# Patient Record
Sex: FEMALE | Race: WHITE | NOT HISPANIC OR LATINO | Employment: UNEMPLOYED | URBAN - METROPOLITAN AREA
[De-identification: names, ages, dates, MRNs, and addresses within clinical notes are randomized per-mention and may not be internally consistent; named-entity substitution may affect disease eponyms.]

---

## 2023-03-30 ENCOUNTER — HOSPITAL ENCOUNTER (OUTPATIENT)
Dept: DATA CONVERSION | Facility: HOSPITAL | Age: 78
End: 2023-03-30
Attending: RADIOLOGY | Admitting: RADIOLOGY
Payer: MEDICARE

## 2023-03-30 DIAGNOSIS — Z51.11 ENCOUNTER FOR ANTINEOPLASTIC CHEMOTHERAPY: ICD-10-CM

## 2023-03-30 DIAGNOSIS — C50.919 MALIGNANT NEOPLASM OF UNSPECIFIED SITE OF UNSPECIFIED FEMALE BREAST (MULTI): ICD-10-CM

## 2023-03-30 DIAGNOSIS — C79.51 SECONDARY MALIGNANT NEOPLASM OF BONE (MULTI): ICD-10-CM

## 2023-10-02 ENCOUNTER — TELEPHONE (OUTPATIENT)
Dept: ADMISSION | Facility: HOSPITAL | Age: 78
End: 2023-10-02
Payer: MEDICARE

## 2023-10-03 RX ORDER — LIDOCAINE, MENTHOL 4; 1 G/100G; G/100G
4 PATCH TOPICAL DAILY PRN
COMMUNITY
Start: 2023-03-21 | End: 2023-11-06 | Stop reason: WASHOUT

## 2023-10-03 RX ORDER — LEVOTHYROXINE SODIUM 100 UG/1
100 TABLET ORAL
COMMUNITY
Start: 2023-08-02

## 2023-10-03 RX ORDER — LETROZOLE 2.5 MG/1
2.5 TABLET, FILM COATED ORAL DAILY
COMMUNITY
Start: 2023-09-15

## 2023-10-03 RX ORDER — DIPHENHYDRAMINE HYDROCHLORIDE 25 MG/1
25 CAPSULE ORAL
COMMUNITY
Start: 2023-03-23 | End: 2023-11-06 | Stop reason: WASHOUT

## 2023-10-03 RX ORDER — DICLOFENAC POTASSIUM 50 MG/1
50 TABLET, FILM COATED ORAL 2 TIMES DAILY
COMMUNITY
Start: 2023-02-28 | End: 2023-11-06 | Stop reason: WASHOUT

## 2023-10-05 NOTE — TELEPHONE ENCOUNTER
Spoke to Leeann, director of radiology at [facility] who will look in to this and follow up with me.

## 2023-10-10 NOTE — TELEPHONE ENCOUNTER
Follow up call made to Leenan at [facility], left voicemail with request for call back.  Pt updated.

## 2023-10-11 DIAGNOSIS — C50.919 MALIGNANT NEOPLASM OF FEMALE BREAST, UNSPECIFIED ESTROGEN RECEPTOR STATUS, UNSPECIFIED LATERALITY, UNSPECIFIED SITE OF BREAST (MULTI): Primary | ICD-10-CM

## 2023-10-11 NOTE — TELEPHONE ENCOUNTER
Per [facility], PET scan can be repeated at no cost.  New order placed by Dr. Kwon to reschedule and patient notified.

## 2023-10-13 ENCOUNTER — APPOINTMENT (OUTPATIENT)
Dept: RADIOLOGY | Facility: HOSPITAL | Age: 78
End: 2023-10-13
Payer: MEDICARE

## 2023-10-16 ENCOUNTER — APPOINTMENT (OUTPATIENT)
Dept: HEMATOLOGY/ONCOLOGY | Facility: CLINIC | Age: 78
End: 2023-10-16
Payer: MEDICARE

## 2023-10-16 ENCOUNTER — APPOINTMENT (OUTPATIENT)
Dept: HEMATOLOGY/ONCOLOGY | Facility: CLINIC | Age: 78
End: 2023-10-16

## 2023-10-17 ENCOUNTER — SPECIALTY PHARMACY (OUTPATIENT)
Dept: PHARMACY | Facility: CLINIC | Age: 78
End: 2023-10-17

## 2023-10-18 DIAGNOSIS — C50.919 MALIGNANT NEOPLASM OF BREAST IN FEMALE, ESTROGEN RECEPTOR POSITIVE, UNSPECIFIED LATERALITY, UNSPECIFIED SITE OF BREAST (MULTI): Primary | ICD-10-CM

## 2023-10-18 DIAGNOSIS — Z17.0 MALIGNANT NEOPLASM OF BREAST IN FEMALE, ESTROGEN RECEPTOR POSITIVE, UNSPECIFIED LATERALITY, UNSPECIFIED SITE OF BREAST (MULTI): Primary | ICD-10-CM

## 2023-10-20 ENCOUNTER — PHARMACY VISIT (OUTPATIENT)
Dept: PHARMACY | Facility: CLINIC | Age: 78
End: 2023-10-20
Payer: COMMERCIAL

## 2023-10-20 ENCOUNTER — SPECIALTY PHARMACY (OUTPATIENT)
Dept: PHARMACY | Facility: CLINIC | Age: 78
End: 2023-10-20

## 2023-10-20 PROCEDURE — RXMED WILLOW AMBULATORY MEDICATION CHARGE

## 2023-10-23 ENCOUNTER — SPECIALTY PHARMACY (OUTPATIENT)
Dept: PHARMACY | Facility: CLINIC | Age: 78
End: 2023-10-23

## 2023-10-26 ENCOUNTER — TELEPHONE (OUTPATIENT)
Dept: RADIATION ONCOLOGY | Facility: HOSPITAL | Age: 78
End: 2023-10-26
Payer: MEDICARE

## 2023-10-26 NOTE — TELEPHONE ENCOUNTER
Called pt. to remind of appointment on 10/30/2023 at 10:00 am with Dr. Beckford. Pt answered and confirmed appointment.

## 2023-10-27 RX ORDER — ACETAMINOPHEN 500 MG
TABLET ORAL
COMMUNITY
Start: 2023-04-18 | End: 2023-11-06 | Stop reason: WASHOUT

## 2023-10-27 RX ORDER — PREDNISONE 50 MG/1
1 TABLET ORAL 3 TIMES DAILY
COMMUNITY
Start: 2023-03-23 | End: 2023-11-06 | Stop reason: WASHOUT

## 2023-10-27 RX ORDER — PREDNISONE 10 MG/1
TABLET ORAL
COMMUNITY
Start: 2023-02-28 | End: 2023-11-06 | Stop reason: WASHOUT

## 2023-10-27 RX ORDER — TIZANIDINE 4 MG/1
.5-1 TABLET ORAL 3 TIMES DAILY
COMMUNITY
Start: 2023-02-16 | End: 2023-11-06 | Stop reason: WASHOUT

## 2023-10-27 RX ORDER — NITROFURANTOIN 25; 75 MG/1; MG/1
100 CAPSULE ORAL 2 TIMES DAILY
COMMUNITY
Start: 2023-08-30 | End: 2023-11-06 | Stop reason: WASHOUT

## 2023-10-27 RX ORDER — MELOXICAM 7.5 MG/1
1 TABLET ORAL 2 TIMES DAILY PRN
COMMUNITY
Start: 2023-01-18 | End: 2023-11-06 | Stop reason: WASHOUT

## 2023-10-30 ENCOUNTER — HOSPITAL ENCOUNTER (OUTPATIENT)
Dept: RADIOLOGY | Facility: HOSPITAL | Age: 78
Discharge: HOME | End: 2023-10-30
Payer: MEDICARE

## 2023-10-30 ENCOUNTER — HOSPITAL ENCOUNTER (OUTPATIENT)
Dept: RADIOLOGY | Facility: HOSPITAL | Age: 78
Discharge: HOME | End: 2023-10-30

## 2023-10-30 ENCOUNTER — HOSPITAL ENCOUNTER (OUTPATIENT)
Dept: RADIATION ONCOLOGY | Facility: HOSPITAL | Age: 78
Setting detail: RADIATION/ONCOLOGY SERIES
Discharge: STILL ADMITTED OR ADMINISTRATIVE | End: 2023-10-30
Payer: MEDICARE

## 2023-10-30 VITALS
SYSTOLIC BLOOD PRESSURE: 126 MMHG | OXYGEN SATURATION: 98 % | BODY MASS INDEX: 34.89 KG/M2 | TEMPERATURE: 96.8 F | HEIGHT: 59 IN | RESPIRATION RATE: 18 BRPM | WEIGHT: 173.06 LBS | HEART RATE: 54 BPM | DIASTOLIC BLOOD PRESSURE: 77 MMHG

## 2023-10-30 DIAGNOSIS — C79.51 METASTASIS TO SPINAL COLUMN (MULTI): Primary | ICD-10-CM

## 2023-10-30 DIAGNOSIS — S12.9XXA FRACTURE OF NECK, UNSPECIFIED, INITIAL ENCOUNTER (MULTI): ICD-10-CM

## 2023-10-30 DIAGNOSIS — C50.919 MALIGNANT NEOPLASM OF FEMALE BREAST, UNSPECIFIED ESTROGEN RECEPTOR STATUS, UNSPECIFIED LATERALITY, UNSPECIFIED SITE OF BREAST (MULTI): ICD-10-CM

## 2023-10-30 DIAGNOSIS — C79.51 SECONDARY MALIGNANT NEOPLASM OF BONE (MULTI): ICD-10-CM

## 2023-10-30 LAB — GLUCOSE BLD MANUAL STRIP-MCNC: 80 MG/DL (ref 74–99)

## 2023-10-30 PROCEDURE — 78816 PET IMAGE W/CT FULL BODY: CPT | Mod: PET TUMOR INIT TX STRAT | Performed by: STUDENT IN AN ORGANIZED HEALTH CARE EDUCATION/TRAINING PROGRAM

## 2023-10-30 PROCEDURE — 72156 MRI NECK SPINE W/O & W/DYE: CPT | Performed by: RADIOLOGY

## 2023-10-30 PROCEDURE — A9575 INJ GADOTERATE MEGLUMI 0.1ML: HCPCS | Performed by: NEUROLOGICAL SURGERY

## 2023-10-30 PROCEDURE — 72156 MRI NECK SPINE W/O & W/DYE: CPT

## 2023-10-30 PROCEDURE — A9552 F18 FDG: HCPCS

## 2023-10-30 PROCEDURE — 78815 PET IMAGE W/CT SKULL-THIGH: CPT | Mod: PI

## 2023-10-30 PROCEDURE — 3430000001 HC RX 343 DIAGNOSTIC RADIOPHARMACEUTICALS

## 2023-10-30 PROCEDURE — 82947 ASSAY GLUCOSE BLOOD QUANT: CPT

## 2023-10-30 PROCEDURE — 2550000001 HC RX 255 CONTRASTS: Performed by: NEUROLOGICAL SURGERY

## 2023-10-30 RX ORDER — FLUDEOXYGLUCOSE F18 300 MCI/ML
9.96 INJECTION INTRAVENOUS
Status: COMPLETED | OUTPATIENT
Start: 2023-10-30 | End: 2023-10-30

## 2023-10-30 RX ORDER — GADOTERATE MEGLUMINE 376.9 MG/ML
15 INJECTION INTRAVENOUS
Status: COMPLETED | OUTPATIENT
Start: 2023-10-30 | End: 2023-10-30

## 2023-10-30 RX ADMIN — GADOTERATE MEGLUMINE 15 ML: 376.9 INJECTION INTRAVENOUS at 09:59

## 2023-10-30 RX ADMIN — FLUDEOXYGLUCOSE F18 9.96 MILLICURIE: 300 INJECTION INTRAVENOUS at 12:32

## 2023-10-30 ASSESSMENT — COLUMBIA-SUICIDE SEVERITY RATING SCALE - C-SSRS
6. HAVE YOU EVER DONE ANYTHING, STARTED TO DO ANYTHING, OR PREPARED TO DO ANYTHING TO END YOUR LIFE?: NO
2. HAVE YOU ACTUALLY HAD ANY THOUGHTS OF KILLING YOURSELF?: NO
1. IN THE PAST MONTH, HAVE YOU WISHED YOU WERE DEAD OR WISHED YOU COULD GO TO SLEEP AND NOT WAKE UP?: NO

## 2023-10-30 ASSESSMENT — ENCOUNTER SYMPTOMS
LOSS OF SENSATION IN FEET: 0
DEPRESSION: 0
OCCASIONAL FEELINGS OF UNSTEADINESS: 1

## 2023-10-30 ASSESSMENT — PATIENT HEALTH QUESTIONNAIRE - PHQ9
SUM OF ALL RESPONSES TO PHQ9 QUESTIONS 1 AND 2: 0
2. FEELING DOWN, DEPRESSED OR HOPELESS: NOT AT ALL
1. LITTLE INTEREST OR PLEASURE IN DOING THINGS: NOT AT ALL

## 2023-10-30 NOTE — PROGRESS NOTES
Follow-Up    Patient: Iva Colby  MRN: [id number]  : [birth year]  Date of visit: 10/30/23  Referred by: Jessee Rodríguez MD  Neurosurgery: Jessee Rodríguez MD  Medical Oncology: Pascale Kwon MD    Diagnosis:  Metastatic breast cancer to C4-C6 s/p operative decompression  Diagnosis Code: C79.51  KPS: 80    Brief Clinical History:  78-year-old woman with history of progressive neck pain, cervical myelopathy and multiple falls at home which prompted workup revealing C4-C6 putative metastatic disease.  Neurosurgery performed anterior C5 corpectomy with posterior C2-T2 fusion on 3/9/23; final pathology revealed metastatic breast cancer. She was seen by Radiation Oncology as an inpatient on 3/8/23 and elected to undergo postoperative radiation treatment of her spinal disease.    Most recent radiation therapy: The patient received spine SBRT (24 Gy/2 fx) to her C4-C6 metastatic disease from 23-23.    Interval since radiation therapy: 6 months    Interval History: Since completing SBRT, the patient was most recently seen by Medical Oncology on 23 where she was well, having been discharged from Physical Therapy. She has been tolerating letrozole/kisqali well; the plan was to continue systemic therapy.  Today she notes no new neck pain, weakness or other neurological symptoms.  She does complain of 1-2/10 right groin pain which she does not take medication for, and has not changed in months.     Review of Systems: A 12-point review of systems was conducted and is as per interval history    Imaging Studies:   Cervical spine MRI (10/30/23 c/w 23): No new fractures or evidence of disease progression in region of treatment field; grossly unchanged from 23 MRI.  This study was read by myself, as the official Radiology read was unavailable at the time of this patient encounter.     Physical Examination:  Vital Signs: T = 36.0, BP = 126/77, P = 54, RR = 18, O2 sat = 98% on RA  General: WD/WN.  In  wheelchair.  Neurologic: Alert and oriented. GCS 15.  Eyes: Anicteric sclera.  EOMI  HENT: Normocephalic; atraumatic.  Cardiovascular: No cyanosis. Normal point of maximum impulse location.   Respiratory: Non-labored respirations.  Symmetrical chest wall expansion.  No audible wheezes.  Gastrointestinal: No abdominal pain or distention on inspection.  No splenomegaly on inspection.  Psychiatry: Appropriate mood and affect.  Alert and oriented.   Lymphatics: No palpable cervical or axillary lymph nodes apparent  Skin: No rashes or masses visible  Extremities: STOCKTON x 4.  No c/c/e.   Delt  Bic  Tric WE WF  Intrinsics HF KE DF PF  R 5/5 5/5 5/5 5/5 5/5 5/5 5/5  5/5 5/5 5/5 5/5  L 5/5 5/5 5/5 5/5 5/5 5/5 5/5 5/5 5/5 5/5 5/5    Assessment:  78-year-old woman with metastatic breast cancer involving symptomatic C4-C6 disease s/p anterior C5 corpectomy with posterior C2-T2 fusion on 3/9/23 by Neurosurgery.  Now 6 months s/p postoperative SBRT (24 Gy/2 fx) to C4-C6 on 4/26/23-4/27/23. 10/30/23 cervical spine MRI = no disease progression; no new fractures.  Neurologically stable.     Plan:  We would like to see the patient in 3 months with a cervical spine MRI without and with contrast.  The patient should continue to see Medical Oncology (11/6/23) and Neurosurgery as scheduled.      The patient was in agreement with the plan, and her questions were answered to her satisfaction.  She knows to contact us at any time with any further questions or concerns.     José Beckford III, M.D.  Director of Spine Oncology   of Radiation Oncology and Neurological Surgery  [facility] [employer]

## 2023-10-30 NOTE — PROGRESS NOTES
"Radiation Oncology Follow-Up    Patient Name:  Iva Colby  MRN:  [id number]  :  1945    Referring Provider: No ref. provider found  Primary Care Provider: Zelalem Zamora MD  Care Team: Patient Care Team:  Zelalem Zamora MD as PCP - General Pascale Kwon MD as Consulting Physician (Hematology and Oncology)    Date of Service: 10/30/2023     SUBJECTIVE  History of Present Illness:   Deepthi Colby is a 78 y.o. female who was previously seen at the [facility] Department of Radiation Oncology for Radiation treatment to spine.      Treatment Rendered:   Radiation Treatments       No radiation treatments to show. (Treatments may have been administered in another system.)            Review of Systems:   Review of Systems - Oncology  The patient's current pain level was assessed.  They report currently having a pain of 2 out of 10.  They feel their pain is under control without the use of pain medications.    Performance Status:   The Karnofsky performance scale today is 80, Normal activity with effort; some signs or symptoms of disease (ECOG equivalent 1).        OBJECTIVE  Vital Signs:  /77 (BP Location: Left arm, Patient Position: Sitting, BP Cuff Size: Large adult)   Pulse 54   Temp 36 °C (96.8 °F) (Temporal)   Resp 18   Ht 1.499 m (4' 11\")   Wt 78.5 kg (173 lb 1 oz)   SpO2 98%   BMI 34.95 kg/m²    Physical Exam:  Physical Exam        ASSESSMENT:  Iva Colby is a 78 y.o. female with No matching staging information was found for the patient..  Pt accompanied by sister and in good spirits. Pt denies any issues other than what's denoted .     PLAN:  a.  NCCN Guidelines were applicable to guide this patients treatment plan.  Fede Abarca RN     "

## 2023-11-03 RX ORDER — DEXTROMETHORPHAN HYDROBROMIDE, GUAIFENESIN 5; 100 MG/5ML; MG/5ML
1300 LIQUID ORAL
COMMUNITY
Start: 2023-03-03

## 2023-11-03 NOTE — PROGRESS NOTES
Breast Medical Oncology Clinic  Location: [place]    Visit Type: Follow-up Visit    ONC History:     Mammogram 8/2022 No mammographic evidence of malignancy. Continued screening with annual  mammograms is recommended.   Patient lives in [place] and was in her usual state of health until she noticed BL shoulder pain in December.   She saw her PCP who obtained plain film imaging which reflexed to MRI. C spine MRI showed C5 vertebral destruction as well as additional C  spine lesions.    3/2/2023 MRI C spine: 1. Pathologic fracture at C5 with near complete vertebral body height loss and posterior cortex bowing resulting in severe canal stenosis at C4-5 and C5-6. Abnormal enhancing tissue fills the bilateral neural foramen at C4-5 and  C5-6 and causes severe bilateral foraminal stenosis, likely neoplastic. Findings are most consistent with multifocal osseous metastatic disease involving at least C1, C4, C5, and C6. Multilevel degenerative changes with additional moderate canal stenosis  at C6-7.   3/3/2023 CT C spine: Near complete destruction of the C5 vertebral body with abnormal epidural tissue resulting in the known severe canal stenosis and neural foraminal stenosis as seen on prior MRI. Destructive lesions involving C1 and C4 through C6 consistent  with a neoplastic process. Findings are similar to yesterday's MRI allowing for differences in technique.   3/3/2023 CT C/A/P: An enlarged bilobed right axillary lymph node with a fatty hilum superiorly measures approximately 1.8 x 1.5 x 3.1 cm (AP x TRV x CC). This appears to have mild surrounding inflammatory changes. No left axillary lymphadenopathy is identified.  No mediastinal or hilar lymphadenopathy.   3/4/2023 Bone scan Diffuse mild abnormal uptake at the level C5 involving the remainder of the vertebral body and the surrounding soft tissues, this could be infectious or neoplastic.   3/5/2023 MRI T spine: Inferior T7 and superior T9 lesions. The T7  "lesion is likely Schmorl's node formation, but the T9 lesion has an appearance at least suggestive of marrow replacement, and metastatic disease is in the differential diagnosis   3/5/2023 She was transferred to [facility] for [facility] care.   3/6/2023: R axillary LN bx: Metastatic breast cancer, ER positive >95%, NM positive 1-5%, HER2 negative (1+)   3/9/2023: C5 corpectomy, strut graft, C2-T2 fusion, C4-6 decompression on 3/10 as well as R axillary gabbi biopsy with pathology returning as ISABEL+ adenocarcinoma likely breast in origin.    3/14/2023: Started on letrozole   4/11/23: Started kisqali   4/25-4/26: SBRT to C4-6 metastatic disease      Subjective: Interval History    Denies interval events since last follow-up- no recent hospitalizations, new medical diagnoses, or new medications.     No pain. Only time neck bothers her is when she moves a certain way. Limited ROM of the neck, more so on the left.     Tolerating treatments well. No side effects. Occasional itching.     Denies weight loss, changes in the breast and/or chest wall, new aches or pains, changes in appetite or energy level.     Currently staying with brother in law.     GYN History/Pertinent Family history:    Family History: Father with prostate cancer and mets to brain, sister with breast cancer dx at 55.     Social History: Lives alone in [place], [facility] 12 pack year smoking hx. Rare EtOH. Niece Cindy Chowdhury is NOK/HCPOA.     Social History  Deepthi Colby  reports that she has never smoked. She has never used smokeless tobacco.  She  reports that she does not currently use alcohol.  She  reports no history of drug use.    ROS:     Review of Systems   All other systems reviewed and are negative.       Physical Examination:    /75   Pulse 58   Temp 36.2 °C (97.2 °F)   Resp 16   Ht 1.449 m (4' 9.05\")   Wt 77.2 kg (170 lb 1.4 oz)   SpO2 96%   BMI 36.75 kg/m²     Physical Exam  Vitals and nursing note reviewed.   Constitutional:       " General: She is not in acute distress.     Appearance: Normal appearance. She is not toxic-appearing.   HENT:      Head: Normocephalic and atraumatic.      Mouth/Throat:      Pharynx: Oropharynx is clear.   Eyes:      Extraocular Movements: Extraocular movements intact.      Conjunctiva/sclera: Conjunctivae normal.   Cardiovascular:      Rate and Rhythm: Normal rate and regular rhythm.   Pulmonary:      Effort: Pulmonary effort is normal. No respiratory distress.   Abdominal:      General: Abdomen is flat. Bowel sounds are normal.      Palpations: Abdomen is soft.   Musculoskeletal:         General: No swelling. Normal range of motion.      Cervical back: Normal range of motion.   Skin:     General: Skin is warm and dry.   Neurological:      General: No focal deficit present.      Mental Status: She is alert.   Psychiatric:         Mood and Affect: Mood normal.       Breast Examination:    Deferred    ECOG Performance Status:     [] 0 Fully active, able to carry on all pre-disease performance without restriction  [x] 1 Restricted in physically strenuous activity but ambulatory and able to carry out work of a light or sedentary nature, e.g., light house work, office work  [] 2 Ambulatory and capable of all selfcare but unable to carry out any work activities; up and about more than 50% of waking hours  [] 3 Capable of only limited selfcare; confined to bed or chair more than 50% of waking hours  [] 4 Completely disabled; cannot carry on any selfcare; totally confined to bed or chair  [] 5 Dead     Results:    Labs:  No new pertinent results since last visit    Imaging:  Reviewed 10/30/23 PET scan:   1. No PET evidence of recurrent disease in the breasts.  2. No PET evidence of gabbi metastasis.  3.  Non FDG avid sclerotic lesions of the right anterior 3rd rib, T7,  and C1, likely representing treated bone metastases  4. FDG avid degenerative changes of the right femoroacetabular joint,  likely inflammatory in  nature.    Pathology:  No new pertinent results since last visit    Assessment:     [place] metastatic breast cancer with bone only involvement, initial presentation 3/2023 with cervical spine fracture; ER/HI positive, HER2 negative (1+ on LN,  0 on bone bx). S/p C5 corpectomy, strut graft, C2-T2 fusion, C4-6 decompression on 3/10/23 with C4-6 SBRT in April 2023. On letrozole and  kisqali.     Iva is a very pleasant post-menopausal woman with newly diagnosed breast cancer with history of hypothyroidism. Tolerating treatment exceedingly well. PET scan reviewed and shows no evidence FDG avid disease.     Plan:    Surgical Plan: Not indicated  Additional biopsy: Not indicated  Radiation Plan: C4-6 SBRT in April 2023.  Additional staging scans/DEXA/echo: Staging scans reviewed  Additional Path info (i.e Ki67, PDL1): MSI high not detected. CCND1 amplification, FGFR1  amplification, TP53 p.R282G ([id number] c.844C>G). Negative for BRAF V600E, PIK3CA and ESR1.   Gene assays:  Not indicated     Systemic treatment plan: Letrozole and ribociclib                   Intent: Palliative, discussed in detail with patient. She has forgotten several times during out encounters. We discussed extensively that stage IV disease and metastatic disease are interchangeable terms.                 Clinical trial: N/A                Endocrine therapy: Letrozole                HER2 treatment: Not indicated                Targeted agents: Ribociclib as above                Chemotherapy: Not indicated                BMA: We discussed the role of Denosumab (allergy to fosamax) in light of osseous disease to minimize the risk and delay skeletal related events. Patient previously declined. Given JENNIFER on recent PET we will defer this for now.      Access: Not indicated  Supportive meds: Not indicated this visit  Genetic testing: Will discuss again at a future visit  Fertility preservation: Not indicated  Other active problems/orders:      Limited  ROM of neck: S/p C5 corpectomy, strut graft, C2-T2 fusion, C4-6 decompression on 3/10/23 with C4-6 SBRT. Will connect patient with Chinyere Bermudez for discussion regarding massage therapy at [place]       Surveillance plan:  PET in 3-4 months; Monthly labs including tumor markers.     Follow-up:  4 months after restaging scans for review. Patient instructed to call between visits if any clinical change.     Patient expressed understanding of the plan outlined above. She had ample time to ask questions. She understands she can contact us should she have additional questions or issues arise in the interim.

## 2023-11-06 ENCOUNTER — OFFICE VISIT (OUTPATIENT)
Dept: HEMATOLOGY/ONCOLOGY | Facility: CLINIC | Age: 78
End: 2023-11-06
Payer: MEDICARE

## 2023-11-06 ENCOUNTER — DOCUMENTATION (OUTPATIENT)
Dept: HEMATOLOGY/ONCOLOGY | Facility: CLINIC | Age: 78
End: 2023-11-06

## 2023-11-06 VITALS
TEMPERATURE: 97.2 F | WEIGHT: 170.09 LBS | SYSTOLIC BLOOD PRESSURE: 136 MMHG | OXYGEN SATURATION: 96 % | HEIGHT: 57 IN | DIASTOLIC BLOOD PRESSURE: 75 MMHG | BODY MASS INDEX: 36.69 KG/M2 | RESPIRATION RATE: 16 BRPM | HEART RATE: 58 BPM

## 2023-11-06 DIAGNOSIS — C50.919 MALIGNANT NEOPLASM OF BREAST IN FEMALE, ESTROGEN RECEPTOR POSITIVE, UNSPECIFIED LATERALITY, UNSPECIFIED SITE OF BREAST (MULTI): Primary | ICD-10-CM

## 2023-11-06 DIAGNOSIS — Z17.0 MALIGNANT NEOPLASM OF BREAST IN FEMALE, ESTROGEN RECEPTOR POSITIVE, UNSPECIFIED LATERALITY, UNSPECIFIED SITE OF BREAST (MULTI): Primary | ICD-10-CM

## 2023-11-06 PROCEDURE — 99215 OFFICE O/P EST HI 40 MIN: CPT | Performed by: STUDENT IN AN ORGANIZED HEALTH CARE EDUCATION/TRAINING PROGRAM

## 2023-11-06 PROCEDURE — 1036F TOBACCO NON-USER: CPT | Performed by: STUDENT IN AN ORGANIZED HEALTH CARE EDUCATION/TRAINING PROGRAM

## 2023-11-06 PROCEDURE — 1159F MED LIST DOCD IN RCRD: CPT | Performed by: STUDENT IN AN ORGANIZED HEALTH CARE EDUCATION/TRAINING PROGRAM

## 2023-11-06 PROCEDURE — 1126F AMNT PAIN NOTED NONE PRSNT: CPT | Performed by: STUDENT IN AN ORGANIZED HEALTH CARE EDUCATION/TRAINING PROGRAM

## 2023-11-06 RX ORDER — GLUCOSAM/CHONDRO/HERB 149/HYAL 750-100 MG
1000 TABLET ORAL
COMMUNITY
Start: 2023-03-03

## 2023-11-06 RX ORDER — IBUPROFEN 100 MG/5ML
1500 SUSPENSION, ORAL (FINAL DOSE FORM) ORAL DAILY
COMMUNITY

## 2023-11-06 RX ORDER — VITAMIN E (DL,TOCOPHERYL ACET) 180 MG
CAPSULE ORAL
COMMUNITY
Start: 2023-03-03

## 2023-11-06 RX ORDER — HYDRALAZINE HYDROCHLORIDE 20 MG/ML
20 INJECTION INTRAMUSCULAR; INTRAVENOUS
COMMUNITY
Start: 2023-03-04 | End: 2024-03-05 | Stop reason: WASHOUT

## 2023-11-06 ASSESSMENT — PAIN SCALES - GENERAL
PAINLEVEL: 0-NO PAIN
PAINLEVEL_OUTOF10: 0 - NO PAIN

## 2023-11-06 NOTE — PATIENT INSTRUCTIONS
Continue letrozole and kisqali  Monthly blood work  PET scan in 4 months  Follow-up with Dr. Kwon after PET scan to review  Please call us if any new concerns

## 2023-11-13 ENCOUNTER — SPECIALTY PHARMACY (OUTPATIENT)
Dept: PHARMACY | Facility: CLINIC | Age: 78
End: 2023-11-13

## 2023-11-13 ENCOUNTER — PHARMACY VISIT (OUTPATIENT)
Dept: PHARMACY | Facility: CLINIC | Age: 78
End: 2023-11-13
Payer: COMMERCIAL

## 2023-11-13 PROCEDURE — RXMED WILLOW AMBULATORY MEDICATION CHARGE

## 2023-11-26 NOTE — PROGRESS NOTES
Integrative Oncology Massage Therapy and Support Initial Visit    Condition of Client (C)   Initial Visit  Referred per Dr. Tucker  Patient arrived ambulatory, steady gait, no assistive device  Identified Patient: via two identifiers: name and date of birth  No signs or symptoms of  COVID-19 noted per patient today  Fall Risk: steady gait  Reviewed: problem list, medication list, platelet levels  No contraindications to massage/precautions: lighter pressure palliative massage due to diagnosis of [place] Metastatic Breast Cancer with bone only involvement (03.23)/treatment medications/last platelets 169 in May 2023  Chief Complaint(S): muscle tension, decreased range of motion in the cervical region  Functional Limitations: decreased range of motion, fascial restrictions  Exacerbating Factors: did not note  Alleviating Factors: did not note  Patient Goals/Intentions: relaxation, decrease muscle tension, and increased range of motion  Patient Preferences: lighter pressure massage techniques  Patient Noted: she experienced a cervical vertebrae fusion with hardware and cage screws in past, noted a tumor crushed her C5/the fusion was from C1 toT2/noted that she has very little range of motion in the cervical vertebrae and experiences muscle tension/denied pain, anxiety, stress, nausea/fatigue: she becomes very tired and rests after physical exertion/interested in massage therapy, not interested in acupuncture/./she has support from family and friends    Actions (A)  Provider reviewed plan for the massage session, no contraindications, precautions lighter pressure palliative massage due to diagnosis of [place] Metastatic Breast Cancer with bone only involvement (03.23)/treatment medications/last platelets 169 in May 2023  Before massage therapy began, provider explained to the patient to communicate at any time if the pressure was causing discomfort past their tolerance level. Patient agreed to advise  therapist.  Massage pressure Scale: 1-2/5   30 minute session: 15 minute massage session  Seated in padded armchair  Provided lighter pressure palliative massage with focus on the head and ears to promote relaxation, posterolateral neck, shoulders, back/superficial fascial release the posterior neck and shoulders  Able to rise from chair without any issues  Friend Cony received a 7 minute seated massage, denied any issues or contraindications  Light to mild pressure massage techniques to the head and ears to promote relaxation, neck, shoulders, and back  She noted she felt relaxed    Response (R)  Patient noted improvement or muscle tension and noted a slight increase in mobility in the neck in rotation and shoulders  Patient's Subjective Pressure Rating: noted it felt really comfortable  Patient noted a sense of well-being    Evaluation (E):  Patient independent in ambulation  Recommendation: patient would like to experience massages every 3-4 weeks  Referring to Dr. Robb Brown, Integrative Oncology/patient not interested in Acupuncture at this time  Advised that self-massage of scalp/ears can promote relaxation and extend the benefits of the massage therapy session  Appointment scheduled for Monday, November 28, 2023  Patient prefers relaxation massage with therapeutic massage to defined areas.  Supported patient's decision and autonomy in choosing what services she will receive in relaxation to her oncology journey  Provided a business card so patient can contact therapist with any concerns or questions about the treatment protocol

## 2023-11-27 ENCOUNTER — DOCUMENTATION (OUTPATIENT)
Dept: HEMATOLOGY/ONCOLOGY | Facility: CLINIC | Age: 78
End: 2023-11-27
Payer: MEDICARE

## 2023-11-27 ENCOUNTER — SPECIALTY PHARMACY (OUTPATIENT)
Dept: HEMATOLOGY/ONCOLOGY | Facility: CLINIC | Age: 78
End: 2023-11-27
Payer: MEDICARE

## 2023-11-27 ASSESSMENT — PAIN SCALES - GENERAL: PAINLEVEL_OUTOF10: 0 - NO PAIN

## 2023-11-27 NOTE — PROGRESS NOTES
Integrative Oncology Massage Therapy and Support Follow Up Visit 1    Condition of Client (C)   Follow Up Visit 1  Referred per Dr. Tucker  Patient arrived ambulatory, steady gait, no assistive device  Identified Patient: via two identifiers: name and date of birth  No signs or symptoms of  COVID-19 noted per patient today  Fall Risk: steady gait  Reviewed: problem list, medication list, platelet levels  No contraindications to massage/precautions: lighter pressure palliative massage due to diagnosis of [place] Metastatic Breast Cancer with bone only involvement (03.23)/treatment/medications/last platelets 169 in May 2023  Chief Complaint(S): muscle tension, decreased range of motion in the cervical region  Functional Limitations: decreased range of motion, fascial restrictions    Exacerbating Factors: past cervical neck fusion  Alleviating Factors: massage  Patient Goals/Intentions: relaxation, decrease muscle tension, and increased range of motion  Patient Preferences: lighter pressure massage techniques  Patient Noted: nice holiday with her niece and nephew and their children, good company, good food and sweets/she showed provider images of the cervical vertebrae fusion with hardware and cage screws/she has very little range of motion in the cervical vertebrae and experiences muscle tension/neck tension reduction lasted through the day of the initial treatment and noted the muscles feel looser since the initial session and noted a little more cervical rotation with only minimal movement of the shoulders; she noted she cannot distinguish if what she feels is muscle tension or the hardware/noted she cannot rate the muscle tension, denied pain, anxiety, stress, nausea/denied fatigue: she becomes very tired and rests after physical exertion, wellbeing rated 10/10, and coping 10/10/not interested in acupuncture/she has support from family and friends    Actions (A)  Provider reviewed plan for the massage session,  no contraindications, precautions lighter pressure palliative massage due to diagnosis of [place] Metastatic Breast Cancer with bone only involvement (03.23)/treatment medications/last platelets 169 in May 2023  Before massage therapy began, provider explained to the patient to communicate at any time if the pressure was causing discomfort past their tolerance level. Patient agreed to advise therapist.  Massage pressure Scale: 0-2/5   50 minute session: 30 minute massage session  Seated in padded armchair  Provided lighter pressure palliative massage with focus on the head and ears to promote relaxation, posterolateral neck, shoulders, back/superficial fascial release to the posterior neck and shoulders  Able to rise from chair without any issues    Response (R)  Patient noted improvement of muscle tension and noted a slight increase in mobility in the neck in rotation and shoulders, could not rate scales  Patient's Subjective Pressure Rating: noted it felt really comfortable, relaxing  Patient noted a sense of well-being    Evaluation (E):  Patient independent in ambulation  Recommendation: patient would like to experience massages every 3-4 weeks  Referring to Dr. Robb Brown, Integrative Oncology on 11.27.2023/patient not interested in Acupuncture at this time  Advised that self-massage of scalp/ears can promote relaxation and extend the benefits of the massage therapy session  Appointment scheduled for Monday, December 18, 2023  Patient prefers relaxation massage with therapeutic massage to defined areas.  Supported patient's decision and autonomy in choosing what services she will receive in relaxation to her oncology journey  Provided a business card so patient can contact therapist with any concerns or questions about the treatment protocol   Referred to Dr. Hart/Jillian Beard PSR will reach out to patient to schedule

## 2023-12-12 ENCOUNTER — TELEPHONE (OUTPATIENT)
Dept: RADIATION ONCOLOGY | Facility: HOSPITAL | Age: 78
End: 2023-12-12
Payer: MEDICARE

## 2023-12-12 ENCOUNTER — TELEPHONE (OUTPATIENT)
Dept: ADMISSION | Facility: HOSPITAL | Age: 78
End: 2023-12-12
Payer: MEDICARE

## 2023-12-12 PROCEDURE — RXMED WILLOW AMBULATORY MEDICATION CHARGE

## 2023-12-12 NOTE — TELEPHONE ENCOUNTER
Patient had reached out to me regarding her FU visit in Feb, 2024.   I called back and left a message.  I suspect that the patient would like to move her FU to the same day as her MR cervical spine on 1/30/24.

## 2023-12-12 NOTE — TELEPHONE ENCOUNTER
Pt left VM on RN triage line.   Has questions about upcoming rad onc appt with KAREN Whyte CNP. In Feb.

## 2023-12-14 ENCOUNTER — PHARMACY VISIT (OUTPATIENT)
Dept: PHARMACY | Facility: CLINIC | Age: 78
End: 2023-12-14
Payer: COMMERCIAL

## 2024-01-08 ENCOUNTER — PHARMACY VISIT (OUTPATIENT)
Dept: PHARMACY | Facility: CLINIC | Age: 79
End: 2024-01-08
Payer: COMMERCIAL

## 2024-01-08 ENCOUNTER — SPECIALTY PHARMACY (OUTPATIENT)
Dept: PHARMACY | Facility: CLINIC | Age: 79
End: 2024-01-08

## 2024-01-08 PROCEDURE — RXMED WILLOW AMBULATORY MEDICATION CHARGE

## 2024-01-30 ENCOUNTER — APPOINTMENT (OUTPATIENT)
Dept: RADIOLOGY | Facility: HOSPITAL | Age: 79
End: 2024-01-30
Payer: MEDICARE

## 2024-02-05 ENCOUNTER — APPOINTMENT (OUTPATIENT)
Dept: RADIATION ONCOLOGY | Facility: HOSPITAL | Age: 79
End: 2024-02-05
Payer: MEDICARE

## 2024-02-06 ENCOUNTER — TELEPHONE (OUTPATIENT)
Dept: ADMISSION | Facility: HOSPITAL | Age: 79
End: 2024-02-06
Payer: MEDICARE

## 2024-02-06 ENCOUNTER — SPECIALTY PHARMACY (OUTPATIENT)
Dept: PHARMACY | Facility: CLINIC | Age: 79
End: 2024-02-06

## 2024-02-06 DIAGNOSIS — C50.919 MALIGNANT NEOPLASM OF BREAST IN FEMALE, ESTROGEN RECEPTOR POSITIVE, UNSPECIFIED LATERALITY, UNSPECIFIED SITE OF BREAST (MULTI): ICD-10-CM

## 2024-02-06 DIAGNOSIS — Z17.0 MALIGNANT NEOPLASM OF BREAST IN FEMALE, ESTROGEN RECEPTOR POSITIVE, UNSPECIFIED LATERALITY, UNSPECIFIED SITE OF BREAST (MULTI): ICD-10-CM

## 2024-02-07 ENCOUNTER — TELEPHONE (OUTPATIENT)
Dept: RADIATION ONCOLOGY | Facility: HOSPITAL | Age: 79
End: 2024-02-07
Payer: MEDICARE

## 2024-02-07 NOTE — TELEPHONE ENCOUNTER
Called pt to remind of appointment on 02/12/24 at 11:00. Pt's phone went to voicemail left number if needs to reschedule.

## 2024-02-08 ENCOUNTER — TELEPHONE (OUTPATIENT)
Dept: HEMATOLOGY/ONCOLOGY | Facility: CLINIC | Age: 79
End: 2024-02-08
Payer: MEDICARE

## 2024-02-08 ASSESSMENT — ENCOUNTER SYMPTOMS
BLOOD IN STOOL: 0
FATIGUE: 0
DIARRHEA: 0
TROUBLE SWALLOWING: 0
CHILLS: 0
VOMITING: 0
LEG SWELLING: 0
DEPRESSION: 0
CHEST TIGHTNESS: 0
CONFUSION: 0
SHORTNESS OF BREATH: 0
HEMATURIA: 0
NERVOUS/ANXIOUS: 0
ABDOMINAL PAIN: 0
SEIZURES: 0
ABDOMINAL DISTENTION: 0
NAUSEA: 0
NUMBNESS: 0
HEADACHES: 0
SPEECH DIFFICULTY: 0
WHEEZING: 0
DIFFICULTY URINATING: 0
FEVER: 0
ADENOPATHY: 0
LIGHT-HEADEDNESS: 0
DIZZINESS: 0
HEMOPTYSIS: 0
COUGH: 0
CONSTIPATION: 0
EYE PROBLEMS: 0

## 2024-02-08 NOTE — PROGRESS NOTES
Radiation Oncology Follow-Up    Patient Name:  Iva Colby  MRN:  [id number]  :  1945    Referring Provider: No ref. provider found  Primary Care Provider: Zelalem Zamora MD  Care Team: Patient Care Team:  Zelalem Zamora MD as PCP - General  Pascale Kwon MD as Consulting Physician (Hematology and Oncology)    Date of Service: 2024     Diagnosis:    Metastatic breast cancer to C4-C6 s/p operative decompression  Diagnosis Code: C79.51    Most recent radiation therapy: The patient received spine SBRT (24 Gy/2 fx) to her C4-C6 metastatic disease from 23-23.     Recent Imaging:   10/30/23 PET scan:   1. No PET evidence of recurrent disease in the breasts.  2. No PET evidence of gabbi metastasis.  3.  Non FDG avid sclerotic lesions of the right anterior 3rd rib, T7,  and C1, likely representing treated bone metastases  4. FDG avid degenerative changes of the right femoroacetabular joint,  likely inflammatory in nature.    SUBJECTIVE  History of Present Illness:   Deepthi Colby is a 78 y.o. female who was previously seen at the [facility] Department of Radiation Oncology for metastatic breast cancer who developed C4-C6 putative metastatic disease.  Treatment included an anterior C5 corpectomy with posterior C2-T2 fusion completed on 3/9/23 followed by SBRT (24 Gy/2fx) to C4-C6 ending on 23.  The patient is currently be managing systemically with Letrozole/ribociclib under the direction of Dr. Kwon.   A restaging PET completed on 10/30/23 showed stable disease.  Today the patient states that he's doing great!  Only complaint is some stiffness in her neck which has remained stable.  Denies chills, fever, fatigue, SOB or chest pain.  Denies headaches, spinal pain, hearing changes or focal sensory/neuro changes.  Does endorses some vision changes related to cataracts.  Denies abdominal pain, nausea, vomiting, diarrhea or constipation.  An MRI of the cervical  completed today shows no evidence of disease.  This was shared with the patient and her daughter.     Treatment Rendered:   Radiation Treatments       No radiation treatments to show. (Treatments may have been administered in another system.)            Review of Systems:   Review of Systems   Constitutional:  Negative for chills, fatigue and fever.   HENT:   Negative for hearing loss, lump/mass and trouble swallowing.    Eyes:  Negative for eye problems.   Respiratory:  Negative for chest tightness, cough, hemoptysis, shortness of breath and wheezing.    Cardiovascular:  Negative for chest pain and leg swelling.   Gastrointestinal:  Negative for abdominal distention, abdominal pain, blood in stool, constipation, diarrhea, nausea and vomiting.   Genitourinary:  Negative for bladder incontinence, difficulty urinating and hematuria.    Musculoskeletal:  Positive for neck stiffness. Negative for back pain, gait problem and neck pain.   Neurological:  Negative for dizziness, gait problem, headaches, light-headedness, numbness, seizures and speech difficulty.   Hematological:  Negative for adenopathy.   Psychiatric/Behavioral:  Negative for confusion and depression. The patient is not nervous/anxious.      The patient's current pain level was assessed.  They report currently having a pain of 0 out of 10.  They feel their pain is under control without the use of pain medications.    Performance Status:   The Karnofsky performance scale today is 100, Fully active, able to carry on all pre-disease performed without restriction (ECOG equivalent 0).        OBJECTIVE  Vital Signs:  There were no vitals taken for this visit.   Physical Exam:  Physical Exam  Constitutional:       General: She is not in acute distress.     Appearance: Normal appearance. She is normal weight. She is not ill-appearing, toxic-appearing or diaphoretic.   HENT:      Head: Normocephalic and atraumatic.      Jaw: No trismus.      Nose: Nose normal. No  congestion or rhinorrhea.      Mouth/Throat:      Mouth: Mucous membranes are moist.   Eyes:      General: Lids are normal. Gaze aligned appropriately.      Extraocular Movements: Extraocular movements intact.      Pupils: Pupils are equal, round, and reactive to light.   Neck:      Thyroid: No thyroid mass or thyroid tenderness.   Cardiovascular:      Rate and Rhythm: Normal rate and regular rhythm.      Pulses: Normal pulses.      Heart sounds: Normal heart sounds. No murmur heard.  Pulmonary:      Effort: Pulmonary effort is normal. No tachypnea or respiratory distress.      Breath sounds: Normal breath sounds. No wheezing or rhonchi.   Abdominal:      General: Abdomen is flat. Bowel sounds are normal. There is no distension.      Palpations: Abdomen is soft. There is no mass.      Tenderness: There is no abdominal tenderness. There is no guarding or rebound.   Musculoskeletal:         General: No swelling, tenderness, deformity or signs of injury. Normal range of motion.      Cervical back: Full passive range of motion without pain, normal range of motion and neck supple. No rigidity or tenderness. No pain with movement. Normal range of motion.      Right lower leg: No edema.      Left lower leg: No edema.   Lymphadenopathy:      Head:      Right side of head: No submental, submandibular, tonsillar, preauricular, posterior auricular or occipital adenopathy.      Left side of head: No submental, submandibular, tonsillar, preauricular, posterior auricular or occipital adenopathy.      Cervical:      Right cervical: No superficial, deep or posterior cervical adenopathy.     Left cervical: No superficial, deep or posterior cervical adenopathy.   Skin:     General: Skin is warm and dry.      Capillary Refill: Capillary refill takes less than 2 seconds.      Coloration: Skin is not jaundiced.      Findings: No erythema, lesion or rash.   Neurological:      General: No focal deficit present.      Mental Status: She is  alert and oriented to person, place, and time.      Cranial Nerves: No cranial nerve deficit.      Sensory: No sensory deficit.      Motor: No weakness.      Coordination: Coordination normal.      Gait: Gait normal.   Psychiatric:         Attention and Perception: Attention normal.         Mood and Affect: Mood normal.         Behavior: Behavior normal. Behavior is cooperative.           ASSESSMENT:  78 y.o. female who was previously seen at the [facility] Department of Radiation Oncology for metastatic breast cancer who developed C4-C6 putative metastatic disease.  Treatment included an anterior C5 corpectomy with posterior C2-T2 fusion completed on 3/9/23 followed by SBRT (24 Gy/2fx) to C4-C6 ending on 4/27/23.  The patient is currently be managing systemically with Letrozole/ribociclib under the direction of Dr. Kwon.   An MRI of the C-spine completed on 2/12/24 showed..    PLAN:      --MRI C-Spine in 4 months.   --FU in Radiation Oncology in 4 months.     Please reach out with any questions or concerns.     NCCN Guidelines were applicable to guide this patients treatment plan.  Raudel Whyte, BERNARDO-CNP

## 2024-02-08 NOTE — TELEPHONE ENCOUNTER
Patient notified by phone that refill was sent by provider.  Call back instructions reviewed.  Patient verbalized understanding.

## 2024-02-08 NOTE — TELEPHONE ENCOUNTER
"----- Message from Pascale Kwon MD sent at 2/8/2024 10:05 AM EST -----  Regarding: FW: Geovannacherelle  Contact: [phone]  Hi was reordered two days ago. Thanks  ----- Message -----  From: Jami Villa RN  Sent: 2/8/2024   9:41 AM EST  To: Pascale Kwon MD; Mckayla Garcia RN; #  Subject: FW: Kisqali                                        ----- Message -----  From: Iva Colby \"Deepthi\"  Sent: 2/8/2024   9:32 AM EST  To: [facility] Rn Care Coordinator - Tiff  Subject: Kisqcharis                                          The med Kisqali needs to be reorder [facility]  Have you received their email requesting the reorder for this drug? Just wondering, thank you for your consideration.  Deepthi Colby       "

## 2024-02-09 PROCEDURE — RXMED WILLOW AMBULATORY MEDICATION CHARGE

## 2024-02-12 ENCOUNTER — PHARMACY VISIT (OUTPATIENT)
Dept: PHARMACY | Facility: CLINIC | Age: 79
End: 2024-02-12
Payer: COMMERCIAL

## 2024-02-12 ENCOUNTER — HOSPITAL ENCOUNTER (OUTPATIENT)
Dept: RADIOLOGY | Facility: HOSPITAL | Age: 79
Discharge: HOME | End: 2024-02-12
Payer: MEDICARE

## 2024-02-12 ENCOUNTER — HOSPITAL ENCOUNTER (OUTPATIENT)
Dept: RADIATION ONCOLOGY | Facility: HOSPITAL | Age: 79
Setting detail: RADIATION/ONCOLOGY SERIES
Discharge: HOME | End: 2024-02-12
Payer: MEDICARE

## 2024-02-12 ENCOUNTER — APPOINTMENT (OUTPATIENT)
Dept: RADIATION ONCOLOGY | Facility: HOSPITAL | Age: 79
End: 2024-02-12
Payer: MEDICARE

## 2024-02-12 VITALS
OXYGEN SATURATION: 97 % | SYSTOLIC BLOOD PRESSURE: 139 MMHG | TEMPERATURE: 96.8 F | HEART RATE: 62 BPM | WEIGHT: 161.16 LBS | RESPIRATION RATE: 18 BRPM | DIASTOLIC BLOOD PRESSURE: 78 MMHG | HEIGHT: 59 IN | BODY MASS INDEX: 32.49 KG/M2

## 2024-02-12 DIAGNOSIS — C79.51 METASTASIS TO SPINAL COLUMN (MULTI): Primary | ICD-10-CM

## 2024-02-12 DIAGNOSIS — C79.51 METASTASIS TO SPINAL COLUMN (MULTI): ICD-10-CM

## 2024-02-12 PROCEDURE — 99214 OFFICE O/P EST MOD 30 MIN: CPT

## 2024-02-12 PROCEDURE — 72156 MRI NECK SPINE W/O & W/DYE: CPT | Performed by: STUDENT IN AN ORGANIZED HEALTH CARE EDUCATION/TRAINING PROGRAM

## 2024-02-12 PROCEDURE — A9575 INJ GADOTERATE MEGLUMI 0.1ML: HCPCS | Performed by: NEUROLOGICAL SURGERY

## 2024-02-12 PROCEDURE — 72156 MRI NECK SPINE W/O & W/DYE: CPT

## 2024-02-12 PROCEDURE — 2550000001 HC RX 255 CONTRASTS: Performed by: NEUROLOGICAL SURGERY

## 2024-02-12 RX ORDER — GADOTERATE MEGLUMINE 376.9 MG/ML
15 INJECTION INTRAVENOUS
Status: COMPLETED | OUTPATIENT
Start: 2024-02-12 | End: 2024-02-12

## 2024-02-12 RX ADMIN — GADOTERATE MEGLUMINE 15 ML: 376.9 INJECTION INTRAVENOUS at 11:09

## 2024-02-12 ASSESSMENT — PATIENT HEALTH QUESTIONNAIRE - PHQ9
1. LITTLE INTEREST OR PLEASURE IN DOING THINGS: NOT AT ALL
2. FEELING DOWN, DEPRESSED OR HOPELESS: NOT AT ALL
SUM OF ALL RESPONSES TO PHQ9 QUESTIONS 1 AND 2: 0

## 2024-02-12 ASSESSMENT — ENCOUNTER SYMPTOMS
NECK STIFFNESS: 1
NECK PAIN: 0
LOSS OF SENSATION IN FEET: 0
BACK PAIN: 0
OCCASIONAL FEELINGS OF UNSTEADINESS: 0
DEPRESSION: 0

## 2024-02-12 ASSESSMENT — COLUMBIA-SUICIDE SEVERITY RATING SCALE - C-SSRS
2. HAVE YOU ACTUALLY HAD ANY THOUGHTS OF KILLING YOURSELF?: NO
6. HAVE YOU EVER DONE ANYTHING, STARTED TO DO ANYTHING, OR PREPARED TO DO ANYTHING TO END YOUR LIFE?: NO
1. IN THE PAST MONTH, HAVE YOU WISHED YOU WERE DEAD OR WISHED YOU COULD GO TO SLEEP AND NOT WAKE UP?: NO

## 2024-02-29 ENCOUNTER — HOSPITAL ENCOUNTER (OUTPATIENT)
Dept: RADIOLOGY | Facility: HOSPITAL | Age: 79
Discharge: HOME | End: 2024-02-29
Payer: MEDICARE

## 2024-02-29 DIAGNOSIS — Z17.0 MALIGNANT NEOPLASM OF BREAST IN FEMALE, ESTROGEN RECEPTOR POSITIVE, UNSPECIFIED LATERALITY, UNSPECIFIED SITE OF BREAST (MULTI): ICD-10-CM

## 2024-02-29 DIAGNOSIS — C50.919 MALIGNANT NEOPLASM OF BREAST IN FEMALE, ESTROGEN RECEPTOR POSITIVE, UNSPECIFIED LATERALITY, UNSPECIFIED SITE OF BREAST (MULTI): ICD-10-CM

## 2024-02-29 PROCEDURE — 78815 PET IMAGE W/CT SKULL-THIGH: CPT | Mod: PET TUMOR SUBSQ TX STRATEGY | Performed by: NUCLEAR MEDICINE

## 2024-02-29 PROCEDURE — 78815 PET IMAGE W/CT SKULL-THIGH: CPT | Mod: PS

## 2024-02-29 PROCEDURE — A9552 F18 FDG: HCPCS | Performed by: STUDENT IN AN ORGANIZED HEALTH CARE EDUCATION/TRAINING PROGRAM

## 2024-02-29 PROCEDURE — 3430000001 HC RX 343 DIAGNOSTIC RADIOPHARMACEUTICALS: Performed by: STUDENT IN AN ORGANIZED HEALTH CARE EDUCATION/TRAINING PROGRAM

## 2024-02-29 RX ORDER — FLUDEOXYGLUCOSE F 18 200 MCI/ML
13 INJECTION, SOLUTION INTRAVENOUS
Status: COMPLETED | OUTPATIENT
Start: 2024-02-29 | End: 2024-02-29

## 2024-02-29 RX ADMIN — FLUDEOXYGLUCOSE F 18 13 MILLICURIE: 200 INJECTION, SOLUTION INTRAVENOUS at 10:54

## 2024-03-01 ENCOUNTER — APPOINTMENT (OUTPATIENT)
Dept: RADIOLOGY | Facility: HOSPITAL | Age: 79
End: 2024-03-01
Payer: MEDICARE

## 2024-03-04 ENCOUNTER — SPECIALTY PHARMACY (OUTPATIENT)
Dept: PHARMACY | Facility: CLINIC | Age: 79
End: 2024-03-04

## 2024-03-04 PROCEDURE — RXMED WILLOW AMBULATORY MEDICATION CHARGE

## 2024-03-05 ENCOUNTER — PHARMACY VISIT (OUTPATIENT)
Dept: PHARMACY | Facility: CLINIC | Age: 79
End: 2024-03-05
Payer: COMMERCIAL

## 2024-03-05 PROBLEM — S22.49XA MULTIPLE FRACTURES OF RIBS: Status: ACTIVE | Noted: 2023-06-02

## 2024-03-05 PROBLEM — C79.51 METASTASIS TO BONE OF UNKNOWN PRIMARY (MULTI): Status: ACTIVE | Noted: 2024-03-05

## 2024-03-05 PROBLEM — C50.919 METASTASIS FROM MALIGNANT TUMOR OF BREAST (MULTI): Status: ACTIVE | Noted: 2023-06-01

## 2024-03-05 PROBLEM — C50.919: Status: ACTIVE | Noted: 2024-03-05

## 2024-03-05 PROBLEM — S12.9XXA FRACTURE OF CERVICAL VERTEBRA (MULTI): Status: ACTIVE | Noted: 2024-03-05

## 2024-03-05 PROBLEM — N88.2 CERVICAL STENOSIS (UTERINE CERVIX): Status: ACTIVE | Noted: 2024-03-05

## 2024-03-05 PROBLEM — M19.90 OSTEOARTHRITIS: Status: ACTIVE | Noted: 2024-03-05

## 2024-03-05 PROBLEM — R91.8 OTHER NONSPECIFIC ABNORMAL FINDING OF LUNG FIELD: Status: ACTIVE | Noted: 2023-06-02

## 2024-03-05 PROBLEM — M85.80 OTHER SPECIFIED DISORDERS OF BONE DENSITY AND STRUCTURE, UNSPECIFIED SITE: Status: ACTIVE | Noted: 2023-06-02

## 2024-03-05 PROBLEM — E03.9 HYPOTHYROIDISM: Status: ACTIVE | Noted: 2024-03-05

## 2024-03-05 PROBLEM — K57.30 DIVERTICULOSIS OF LARGE INTESTINE WITHOUT PERFORATION OR ABSCESS: Status: ACTIVE | Noted: 2023-06-02

## 2024-03-05 PROBLEM — M84.48XA PATHOLOGICAL FRACTURE OF VERTEBRA: Status: ACTIVE | Noted: 2024-03-05

## 2024-03-05 PROBLEM — R59.0 LOCALIZED ENLARGED LYMPH NODES: Status: ACTIVE | Noted: 2023-06-02

## 2024-03-05 PROBLEM — K80.20 CALCULUS OF GALLBLADDER WITHOUT CHOLECYSTITIS WITHOUT OBSTRUCTION: Status: ACTIVE | Noted: 2023-06-02

## 2024-03-05 PROBLEM — C79.9 METASTASIS FROM MALIGNANT TUMOR OF BREAST (MULTI): Status: ACTIVE | Noted: 2023-06-01

## 2024-03-05 PROBLEM — C80.1 METASTASIS TO BONE OF UNKNOWN PRIMARY (MULTI): Status: ACTIVE | Noted: 2024-03-05

## 2024-03-05 PROBLEM — Z98.1 HISTORY OF CERVICAL SPINAL ARTHRODESIS: Status: ACTIVE | Noted: 2024-03-05

## 2024-03-05 PROBLEM — C79.89 SECONDARY MALIGNANT NEOPLASM OF OTHER SPECIFIED SITES (MULTI): Status: ACTIVE | Noted: 2023-06-02

## 2024-03-05 PROBLEM — C79.51 MALIGNANT NEOPLASM METASTATIC TO BONE (MULTI): Status: ACTIVE | Noted: 2023-09-22

## 2024-03-05 PROBLEM — Z17.0 ESTROGEN RECEPTOR POSITIVE STATUS (ER+): Status: ACTIVE | Noted: 2023-09-22

## 2024-03-05 PROBLEM — Z51.11 ENCOUNTER FOR ANTINEOPLASTIC CHEMOTHERAPY: Status: ACTIVE | Noted: 2024-03-05

## 2024-03-05 RX ORDER — LIDOCAINE 560 MG/1
PATCH PERCUTANEOUS; TOPICAL; TRANSDERMAL
COMMUNITY
Start: 2023-03-20

## 2024-03-11 ENCOUNTER — OFFICE VISIT (OUTPATIENT)
Dept: HEMATOLOGY/ONCOLOGY | Facility: CLINIC | Age: 79
End: 2024-03-11
Payer: MEDICARE

## 2024-03-11 VITALS
SYSTOLIC BLOOD PRESSURE: 153 MMHG | HEART RATE: 62 BPM | OXYGEN SATURATION: 97 % | WEIGHT: 164.46 LBS | BODY MASS INDEX: 33.22 KG/M2 | TEMPERATURE: 97.7 F | DIASTOLIC BLOOD PRESSURE: 84 MMHG | RESPIRATION RATE: 16 BRPM

## 2024-03-11 DIAGNOSIS — C50.919 MALIGNANT NEOPLASM OF BREAST IN FEMALE, ESTROGEN RECEPTOR POSITIVE, UNSPECIFIED LATERALITY, UNSPECIFIED SITE OF BREAST (MULTI): ICD-10-CM

## 2024-03-11 DIAGNOSIS — Z17.0 MALIGNANT NEOPLASM OF BREAST IN FEMALE, ESTROGEN RECEPTOR POSITIVE, UNSPECIFIED LATERALITY, UNSPECIFIED SITE OF BREAST (MULTI): ICD-10-CM

## 2024-03-11 PROCEDURE — 1159F MED LIST DOCD IN RCRD: CPT | Performed by: STUDENT IN AN ORGANIZED HEALTH CARE EDUCATION/TRAINING PROGRAM

## 2024-03-11 PROCEDURE — 99214 OFFICE O/P EST MOD 30 MIN: CPT | Performed by: STUDENT IN AN ORGANIZED HEALTH CARE EDUCATION/TRAINING PROGRAM

## 2024-03-11 PROCEDURE — 1126F AMNT PAIN NOTED NONE PRSNT: CPT | Performed by: STUDENT IN AN ORGANIZED HEALTH CARE EDUCATION/TRAINING PROGRAM

## 2024-03-11 PROCEDURE — 1036F TOBACCO NON-USER: CPT | Performed by: STUDENT IN AN ORGANIZED HEALTH CARE EDUCATION/TRAINING PROGRAM

## 2024-03-11 ASSESSMENT — PAIN SCALES - GENERAL: PAINLEVEL: 0-NO PAIN

## 2024-03-11 NOTE — PROGRESS NOTES
Breast Medical Oncology Clinic  Location: [place]    Visit Type: Follow-up Visit    ONC History:     Mammogram 8/2022 No mammographic evidence of malignancy. Continued screening with annual  mammograms is recommended.   Patient lives in [place] and was in her usual state of health until she noticed BL shoulder pain in December.   She saw her PCP who obtained plain film imaging which reflexed to MRI. C spine MRI showed C5 vertebral destruction as well as additional C  spine lesions.    3/2/2023 MRI C spine: 1. Pathologic fracture at C5 with near complete vertebral body height loss and posterior cortex bowing resulting in severe canal stenosis at C4-5 and C5-6. Abnormal enhancing tissue fills the bilateral neural foramen at C4-5 and  C5-6 and causes severe bilateral foraminal stenosis, likely neoplastic. Findings are most consistent with multifocal osseous metastatic disease involving at least C1, C4, C5, and C6. Multilevel degenerative changes with additional moderate canal stenosis  at C6-7.   3/3/2023 CT C spine: Near complete destruction of the C5 vertebral body with abnormal epidural tissue resulting in the known severe canal stenosis and neural foraminal stenosis as seen on prior MRI. Destructive lesions involving C1 and C4 through C6 consistent  with a neoplastic process. Findings are similar to yesterday's MRI allowing for differences in technique.   3/3/2023 CT C/A/P: An enlarged bilobed right axillary lymph node with a fatty hilum superiorly measures approximately 1.8 x 1.5 x 3.1 cm (AP x TRV x CC). This appears to have mild surrounding inflammatory changes. No left axillary lymphadenopathy is identified.  No mediastinal or hilar lymphadenopathy.   3/4/2023 Bone scan Diffuse mild abnormal uptake at the level C5 involving the remainder of the vertebral body and the surrounding soft tissues, this could be infectious or neoplastic.   3/5/2023 MRI T spine: Inferior T7 and superior T9 lesions. The T7  "lesion is likely Schmorl's node formation, but the T9 lesion has an appearance at least suggestive of marrow replacement, and metastatic disease is in the differential diagnosis   3/5/2023 She was transferred to [facility] for further care.   3/6/2023: R axillary LN bx: Metastatic breast cancer, ER positive >95%, VT positive 1-5%, HER2 negative (1+)   3/9/2023: C5 corpectomy, strut graft, C2-T2 fusion, C4-6 decompression on 3/10 as well as R axillary gabbi biopsy with pathology returning as ISABEL+ adenocarcinoma likely breast in origin.    3/14/2023: Started on letrozole   4/11/23: Started kisqali   4/25-4/26: SBRT to C4-6 metastatic disease      Subjective: Interval History    Denies interval events since last follow-up- no recent hospitalizations, new medical diagnoses, or new medications.     Arthritis pain in base of pointer finger.    Range of motion is still limited in neck.     She continues to occasionally get itchiness at night from the medications.    Overall patient is doing very well.  She is in great spirits.  Tolerating medications without any major issues.      GYN History/Pertinent Family history:    Family History: Father with prostate cancer and mets to brain, sister with breast cancer dx at 55.     Social History: Lives alone in [place], remote 12 pack year smoking hx. Rare EtOH. Niece Cindy Chowdhury is NOK/HCPOA.     Social History  Iva Colby \"Deepthi\"  reports that she has never smoked. She has never used smokeless tobacco.  She  reports that she does not currently use alcohol.  She  reports no history of drug use.    ROS:     Review of Systems   All other systems reviewed and are negative.       Physical Examination:    /84   Pulse 62   Temp 36.5 °C (97.7 °F)   Resp 16   Wt 74.6 kg (164 lb 7.4 oz)   SpO2 97%   BMI 33.22 kg/m²     Physical Exam  Vitals and nursing note reviewed.   Constitutional:       General: She is not in acute distress.     Appearance: Normal appearance. She is not " toxic-appearing.   HENT:      Head: Normocephalic and atraumatic.      Mouth/Throat:      Pharynx: Oropharynx is clear.   Eyes:      Extraocular Movements: Extraocular movements intact.      Conjunctiva/sclera: Conjunctivae normal.   Cardiovascular:      Rate and Rhythm: Normal rate and regular rhythm.   Pulmonary:      Effort: Pulmonary effort is normal. No respiratory distress.   Abdominal:      General: Abdomen is flat. Bowel sounds are normal.      Palpations: Abdomen is soft.   Musculoskeletal:         General: No swelling. Normal range of motion.      Cervical back: Normal range of motion.   Skin:     General: Skin is warm and dry.   Neurological:      General: No focal deficit present.      Mental Status: She is alert.   Psychiatric:         Mood and Affect: Mood normal.       Breast Examination:    Deferred    ECOG Performance Status:     [] 0 Fully active, able to carry on all pre-disease performance without restriction  [x] 1 Restricted in physically strenuous activity but ambulatory and able to carry out work of a light or sedentary nature, e.g., light house work, office work  [] 2 Ambulatory and capable of all selfcare but unable to carry out any work activities; up and about more than 50% of waking hours  [] 3 Capable of only limited selfcare; confined to bed or chair more than 50% of waking hours  [] 4 Completely disabled; cannot carry on any selfcare; totally confined to bed or chair  [] 5 Dead     Results:    Labs:  No new pertinent results since last visit    Imaging:  Reviewed 2/29/2024 PET scan:   1. No evidence of FDG avid soft tissue in the bilateral breasts to  suggest regional recurrence.  2. No evidence of FDG avid lymphadenopathy or distant metastases.  3. Stable sclerotic changes in the axial skeleton without significant  FDG avidity, likely representing treated osseous metastatic disease.    Pathology:  No new pertinent results since last visit    Assessment:     [place] metastatic breast  cancer with bone only involvement, initial presentation 3/2023 with cervical spine fracture; ER/TX positive, HER2 negative (1+ on LN,  0 on bone bx). S/p C5 corpectomy, strut graft, C2-T2 fusion, C4-6 decompression on 3/10/23 with C4-6 SBRT in April 2023. On letrozole and  kisqali.     Iva is a very pleasant post-menopausal woman with newly diagnosed breast cancer with history of hypothyroidism. Tolerating treatment exceedingly well. PET scan reviewed and shows no evidence FDG avid disease.     Plan:    Surgical Plan: Not indicated  Additional biopsy: Not indicated  Radiation Plan: C4-6 SBRT in April 2023.  Additional staging scans/DEXA/echo: Staging scans reviewed  Additional Path info (i.e Ki67, PDL1): MSI high not detected. CCND1 amplification, FGFR1  amplification, TP53 p.R282G ([id number] c.[id number]>G). Negative for BRAF V600E, PIK3CA and ESR1.   Gene assays:  Not indicated     Systemic treatment plan: Letrozole and ribociclib                   Intent: Palliative, discussed in detail with patient. She has forgotten several times during out encounters. We discussed extensively that stage IV disease and metastatic disease are interchangeable terms.                 Clinical trial: N/A                Endocrine therapy: Letrozole                HER2 treatment: Not indicated                Targeted agents: Ribociclib as above                Chemotherapy: Not indicated                BMA: We discussed the role of Denosumab (allergy to fosamax) in light of osseous disease to minimize the risk and delay skeletal related events. Patient previously declined. Given JENNIFER on recent PET we will defer this for now.      Access: Not indicated  Supportive meds: Not indicated this visit  Genetic testing: Will discuss again at a future visit  Fertility preservation: Not indicated  Other active problems/orders:      Limited ROM of neck: S/p C5 corpectomy, strut graft, C2-T2 fusion, C4-6 decompression on 3/10/23 with C4-6 SBRT. Will  connect patient with Chinyere Bermudez for discussion regarding massage therapy at [place] she follows with neurosurgery and radiation oncology with MRI of cervical spine       Surveillance plan:  PET in 3-4 months; Monthly labs including tumor markers.     Follow-up:  4 months after restaging scans for review. Patient instructed to call between visits if any clinical change.     Patient expressed understanding of the plan outlined above. She had ample time to ask questions. She understands she can contact us should she have additional questions or issues arise in the interim.

## 2024-03-14 ENCOUNTER — SPECIALTY PHARMACY (OUTPATIENT)
Dept: HEMATOLOGY/ONCOLOGY | Facility: CLINIC | Age: 79
End: 2024-03-14
Payer: MEDICARE

## 2024-04-01 ENCOUNTER — SPECIALTY PHARMACY (OUTPATIENT)
Dept: PHARMACY | Facility: CLINIC | Age: 79
End: 2024-04-01

## 2024-04-01 PROCEDURE — RXMED WILLOW AMBULATORY MEDICATION CHARGE

## 2024-04-04 ENCOUNTER — PHARMACY VISIT (OUTPATIENT)
Dept: PHARMACY | Facility: CLINIC | Age: 79
End: 2024-04-04
Payer: COMMERCIAL

## 2024-04-29 ENCOUNTER — SPECIALTY PHARMACY (OUTPATIENT)
Dept: PHARMACY | Facility: CLINIC | Age: 79
End: 2024-04-29

## 2024-04-29 PROCEDURE — RXMED WILLOW AMBULATORY MEDICATION CHARGE

## 2024-05-02 ENCOUNTER — PHARMACY VISIT (OUTPATIENT)
Dept: PHARMACY | Facility: CLINIC | Age: 79
End: 2024-05-02
Payer: COMMERCIAL

## 2024-05-16 ENCOUNTER — TELEPHONE (OUTPATIENT)
Dept: ADMISSION | Facility: HOSPITAL | Age: 79
End: 2024-05-16
Payer: MEDICARE

## 2024-05-16 ENCOUNTER — TELEPHONE (OUTPATIENT)
Dept: HEMATOLOGY/ONCOLOGY | Facility: CLINIC | Age: 79
End: 2024-05-16

## 2024-05-16 NOTE — TELEPHONE ENCOUNTER
Received a page from [address], Teresa from [facility] is calling to discuss lab results, wants to report glucose of 44 from 1042 this morning.     This RN called the patient to check in on her. The patient states that she ate this morning and feeling fine, when she got home from the lab and also ate a salmon burger with fruit after getting her labs, denies light headedness or any symptom of low blood sugar, does not have diabetes so does not check her sugar but feels absolutely fine. Message sent to on-call fellow to be sure no intervention is needed. The patient aware that if she begins to feel light headed or symptomatic of low blood sugar to go to an ER, she was agreeable

## 2024-05-21 ENCOUNTER — SPECIALTY PHARMACY (OUTPATIENT)
Dept: PHARMACY | Facility: CLINIC | Age: 79
End: 2024-05-21

## 2024-05-21 DIAGNOSIS — C50.919 MALIGNANT NEOPLASM OF BREAST IN FEMALE, ESTROGEN RECEPTOR POSITIVE, UNSPECIFIED LATERALITY, UNSPECIFIED SITE OF BREAST (MULTI): ICD-10-CM

## 2024-05-21 DIAGNOSIS — Z17.0 MALIGNANT NEOPLASM OF BREAST IN FEMALE, ESTROGEN RECEPTOR POSITIVE, UNSPECIFIED LATERALITY, UNSPECIFIED SITE OF BREAST (MULTI): ICD-10-CM

## 2024-05-21 PROCEDURE — RXMED WILLOW AMBULATORY MEDICATION CHARGE

## 2024-05-28 ENCOUNTER — SPECIALTY PHARMACY (OUTPATIENT)
Dept: PHARMACY | Facility: CLINIC | Age: 79
End: 2024-05-28

## 2024-05-29 ENCOUNTER — PHARMACY VISIT (OUTPATIENT)
Dept: PHARMACY | Facility: CLINIC | Age: 79
End: 2024-05-29
Payer: COMMERCIAL

## 2024-06-13 DIAGNOSIS — Z17.0 MALIGNANT NEOPLASM OF BREAST IN FEMALE, ESTROGEN RECEPTOR POSITIVE, UNSPECIFIED LATERALITY, UNSPECIFIED SITE OF BREAST (MULTI): Primary | ICD-10-CM

## 2024-06-13 DIAGNOSIS — C50.919 MALIGNANT NEOPLASM OF BREAST IN FEMALE, ESTROGEN RECEPTOR POSITIVE, UNSPECIFIED LATERALITY, UNSPECIFIED SITE OF BREAST (MULTI): Primary | ICD-10-CM

## 2024-06-13 RX ORDER — LETROZOLE 2.5 MG/1
2.5 TABLET, FILM COATED ORAL DAILY
Qty: 90 TABLET | Refills: 4 | Status: SHIPPED | OUTPATIENT
Start: 2024-06-13

## 2024-06-14 ENCOUNTER — TELEPHONE (OUTPATIENT)
Dept: RADIATION ONCOLOGY | Facility: HOSPITAL | Age: 79
End: 2024-06-14
Payer: MEDICARE

## 2024-06-14 NOTE — TELEPHONE ENCOUNTER
Called pt to remind of appointment on 06/17/24 at 11:30. Pt's phone went to voicemail left number if needs to reschedule.

## 2024-06-17 ENCOUNTER — HOSPITAL ENCOUNTER (OUTPATIENT)
Dept: RADIOLOGY | Facility: HOSPITAL | Age: 79
Discharge: HOME | End: 2024-06-17
Payer: MEDICARE

## 2024-06-17 ENCOUNTER — HOSPITAL ENCOUNTER (OUTPATIENT)
Dept: RADIATION ONCOLOGY | Facility: HOSPITAL | Age: 79
Setting detail: RADIATION/ONCOLOGY SERIES
Discharge: HOME | End: 2024-06-17
Payer: MEDICARE

## 2024-06-17 VITALS
TEMPERATURE: 96.8 F | OXYGEN SATURATION: 97 % | WEIGHT: 160.27 LBS | DIASTOLIC BLOOD PRESSURE: 71 MMHG | BODY MASS INDEX: 32.37 KG/M2 | RESPIRATION RATE: 18 BRPM | HEART RATE: 52 BPM | SYSTOLIC BLOOD PRESSURE: 113 MMHG

## 2024-06-17 DIAGNOSIS — C79.51 METASTASIS TO SPINAL COLUMN (MULTI): ICD-10-CM

## 2024-06-17 DIAGNOSIS — C50.919 CARCINOMA OF BREAST METASTATIC TO MULTIPLE SITES, UNSPECIFIED LATERALITY (MULTI): Primary | ICD-10-CM

## 2024-06-17 PROCEDURE — 2550000001 HC RX 255 CONTRASTS

## 2024-06-17 PROCEDURE — A9575 INJ GADOTERATE MEGLUMI 0.1ML: HCPCS

## 2024-06-17 PROCEDURE — 72156 MRI NECK SPINE W/O & W/DYE: CPT

## 2024-06-17 PROCEDURE — 99214 OFFICE O/P EST MOD 30 MIN: CPT

## 2024-06-17 PROCEDURE — 72156 MRI NECK SPINE W/O & W/DYE: CPT | Performed by: RADIOLOGY

## 2024-06-17 RX ORDER — GADOTERATE MEGLUMINE 376.9 MG/ML
15 INJECTION INTRAVENOUS
Status: COMPLETED | OUTPATIENT
Start: 2024-06-17 | End: 2024-06-17

## 2024-06-17 RX ORDER — GADOTERATE MEGLUMINE 376.9 MG/ML
15 INJECTION INTRAVENOUS
Status: CANCELLED | OUTPATIENT
Start: 2024-06-17

## 2024-06-17 ASSESSMENT — ENCOUNTER SYMPTOMS
DIARRHEA: 0
HEADACHES: 0
ABDOMINAL PAIN: 0
CONSTIPATION: 0
COUGH: 0
ABDOMINAL DISTENTION: 0
NERVOUS/ANXIOUS: 0
NUMBNESS: 0
VOMITING: 0
NAUSEA: 0
DIFFICULTY URINATING: 0
CHILLS: 0
ADENOPATHY: 0
WHEEZING: 0
BACK PAIN: 0
DEPRESSION: 0
LOSS OF SENSATION IN FEET: 0
LIGHT-HEADEDNESS: 0
SPEECH DIFFICULTY: 0
LEG SWELLING: 0
HEMOPTYSIS: 0
EYE PROBLEMS: 0
OCCASIONAL FEELINGS OF UNSTEADINESS: 0
NECK PAIN: 0
NECK STIFFNESS: 1
FATIGUE: 0
SEIZURES: 0
CONFUSION: 0
BLOOD IN STOOL: 0
CHEST TIGHTNESS: 0
DIZZINESS: 0
SHORTNESS OF BREATH: 0
HEMATURIA: 0
TROUBLE SWALLOWING: 0
FEVER: 0

## 2024-06-17 ASSESSMENT — COLUMBIA-SUICIDE SEVERITY RATING SCALE - C-SSRS
6. HAVE YOU EVER DONE ANYTHING, STARTED TO DO ANYTHING, OR PREPARED TO DO ANYTHING TO END YOUR LIFE?: NO
1. IN THE PAST MONTH, HAVE YOU WISHED YOU WERE DEAD OR WISHED YOU COULD GO TO SLEEP AND NOT WAKE UP?: NO
2. HAVE YOU ACTUALLY HAD ANY THOUGHTS OF KILLING YOURSELF?: NO

## 2024-06-17 ASSESSMENT — PATIENT HEALTH QUESTIONNAIRE - PHQ9
2. FEELING DOWN, DEPRESSED OR HOPELESS: NOT AT ALL
SUM OF ALL RESPONSES TO PHQ9 QUESTIONS 1 AND 2: 0
1. LITTLE INTEREST OR PLEASURE IN DOING THINGS: NOT AT ALL

## 2024-06-17 ASSESSMENT — PAIN SCALES - GENERAL: PAINLEVEL: 0-NO PAIN

## 2024-06-17 NOTE — PROGRESS NOTES
Radiation Oncology Follow-Up    Patient Name:  Iva Colby  MRN:  [id number]  :  1945    Referring Provider: Raudel Whyte, *  Primary Care Provider: Zelalem Zamora MD  Care Team: Patient Care Team:  Zelalem Zamora MD as PCP - General  Pascale Kwon MD as Consulting Physician (Hematology and Oncology)    Date of Service: 2024     Diagnosis:    Metastatic breast cancer to C4-C6 s/p operative decompression  Diagnosis Code: C79.51    Most recent radiation therapy: The patient received spine SBRT (24 Gy/2 fx) to her C4-C6 metastatic disease from 23-23.     Recent Imaging:   10/30/23 PET scan:   1. No PET evidence of recurrent disease in the breasts.  2. No PET evidence of gabbi metastasis.  3.  Non FDG avid sclerotic lesions of the right anterior 3rd rib, T7,  and C1, likely representing treated bone metastases  4. FDG avid degenerative changes of the right femoroacetabular joint,  likely inflammatory in nature.    SUBJECTIVE  History of Present Illness:   Deepthi Colby is a 79 y.o. female who was previously seen at the [facility] Department of Radiation Oncology for metastatic breast cancer who developed C4-C6 putative metastatic disease.  Treatment included an anterior C5 corpectomy with posterior C2-T2 fusion completed on 3/9/23 followed by SBRT (24 Gy/2fx) to C4-C6 ending on 23.  The patient is currently be managing systemically with Letrozole/ribociclib under the direction of Dr. Kwon.   A restaging PET completed on 10/30/23 showed stable disease.  Today the patient states that he's doing great!  Only complaint is some stiffness in her neck which has remained stable.  Denies chills, fever, fatigue, SOB or chest pain.  Denies headaches, spinal pain, hearing changes or focal sensory/neuro changes.  Does endorses some vision changes related to cataracts.  Denies abdominal pain, nausea, vomiting, diarrhea or constipation.  An MRI of the  cervical completed today shows no evidence of disease.  This was shared with the patient and her daughter.     6/17/24 Interval Visit:   Patient is in clinic today for a routine Radiation Oncology FU visit and review of an MRI of the C-spine.  Today the patient states that she's doing very well!  Continues to c/o some decreased ROM in her neck.  We discussed a referral to PT and the patient declined at this time.  Also endorse some weakness in her upper arms, since surgery, that has remained stable.  She continue to do strengthening exercises on a daily basis.   Denies chills, fever, fatigue, SOB or chest pain.  Denies headaches, spinal pain, hearing changes or focal sensory/neuro changes.  Does endorses some vision changes related to cataracts.  Denies abdominal pain, nausea, vomiting, diarrhea or constipation.  An MRI of the cervical completed today shows stable disease.  This was shared with the patient.     Treatment Rendered:   Radiation Treatments       No radiation treatments to show. (Treatments may have been administered in another system.)            Review of Systems:   Review of Systems   Constitutional:  Negative for chills, fatigue and fever.   HENT:   Negative for hearing loss, lump/mass and trouble swallowing.    Eyes:  Negative for eye problems.   Respiratory:  Negative for chest tightness, cough, hemoptysis, shortness of breath and wheezing.    Cardiovascular:  Negative for chest pain and leg swelling.   Gastrointestinal:  Negative for abdominal distention, abdominal pain, blood in stool, constipation, diarrhea, nausea and vomiting.   Genitourinary:  Negative for bladder incontinence, difficulty urinating and hematuria.    Musculoskeletal:  Positive for neck stiffness. Negative for back pain, gait problem and neck pain.   Neurological:  Negative for dizziness, gait problem, headaches, light-headedness, numbness, seizures and speech difficulty.   Hematological:  Negative for adenopathy.  "  Psychiatric/Behavioral:  Negative for confusion and depression. The patient is not nervous/anxious.      The patient's current pain level was assessed.  They report currently having a pain of 0 out of 10.  They feel their pain is under control without the use of pain medications.    Performance Status:   The Karnofsky performance scale today is 80-90%.      OBJECTIVE  Vital Signs:      3/30/2023     9:28 AM 5/18/2023     1:18 PM 10/30/2023    10:35 AM 11/6/2023     2:34 PM 2/12/2024    12:00 PM 3/11/2024     2:39 PM 6/17/2024    12:10 PM   Vitals   Systolic 134 140 126 136 139 153 113   Diastolic 78 59 77 75 78 84 71   Heart Rate 86 66 54 58 62 62 52   Temp   36 °C (96.8 °F) 36.2 °C (97.2 °F) 36 °C (96.8 °F) 36.5 °C (97.7 °F) 36 °C (96.8 °F)   Resp 17 18 18 16 18 16 18   Height (in) 1.499 m (4' 11\") 1.499 m (4' 11\") 1.499 m (4' 11\") 1.449 m (4' 9.05\") 1.499 m (4' 11\")     Weight (lb) 205 191 173.06 170.09 161.16 164.46 160.27   BMI 41.4 kg/m2 38.58 kg/m2 34.95 kg/m2 36.75 kg/m2 32.55 kg/m2 33.22 kg/m2 32.37 kg/m2   BSA (m2) 1.97 m2 1.9 m2 1.81 m2 1.76 m2 1.74 m2 1.76 m2 1.74 m2   Visit Report    Report  Report       Physical Exam:  Physical Exam  Constitutional:       General: She is not in acute distress.     Appearance: Normal appearance. She is normal weight. She is not ill-appearing, toxic-appearing or diaphoretic.   HENT:      Head: Normocephalic and atraumatic.      Jaw: No trismus.      Nose: Nose normal. No congestion or rhinorrhea.      Mouth/Throat:      Mouth: Mucous membranes are moist.   Eyes:      General: Lids are normal. Gaze aligned appropriately.      Extraocular Movements: Extraocular movements intact.      Pupils: Pupils are equal, round, and reactive to light.   Neck:      Thyroid: No thyroid mass or thyroid tenderness.   Cardiovascular:      Rate and Rhythm: Normal rate and regular rhythm.      Pulses: Normal pulses.      Heart sounds: Normal heart sounds. No murmur heard.  Pulmonary:      " Effort: Pulmonary effort is normal. No tachypnea or respiratory distress.      Breath sounds: Normal breath sounds. No wheezing or rhonchi.   Abdominal:      General: Abdomen is flat. Bowel sounds are normal. There is no distension.      Palpations: Abdomen is soft. There is no mass.      Tenderness: There is no abdominal tenderness. There is no guarding or rebound.   Musculoskeletal:         General: No swelling, tenderness, deformity or signs of injury. Normal range of motion.      Cervical back: Full passive range of motion without pain, normal range of motion and neck supple. No rigidity or tenderness. No pain with movement. Normal range of motion.      Right lower leg: No edema.      Left lower leg: No edema.   Lymphadenopathy:      Head:      Right side of head: No submental, submandibular, tonsillar, preauricular, posterior auricular or occipital adenopathy.      Left side of head: No submental, submandibular, tonsillar, preauricular, posterior auricular or occipital adenopathy.      Cervical:      Right cervical: No superficial, deep or posterior cervical adenopathy.     Left cervical: No superficial, deep or posterior cervical adenopathy.   Skin:     General: Skin is warm and dry.      Capillary Refill: Capillary refill takes less than 2 seconds.      Coloration: Skin is not jaundiced.      Findings: No erythema, lesion or rash.   Neurological:      General: No focal deficit present.      Mental Status: She is alert and oriented to person, place, and time.      Cranial Nerves: No cranial nerve deficit.      Sensory: No sensory deficit.      Motor: No weakness.      Coordination: Coordination normal.      Gait: Gait normal.   Psychiatric:         Attention and Perception: Attention normal.         Mood and Affect: Mood normal.         Behavior: Behavior normal. Behavior is cooperative.           ASSESSMENT:  79 y.o. female who was previously seen at the [facility] Department  of Radiation Oncology for metastatic breast cancer who developed C4-C6 putative metastatic disease.  Treatment included an anterior C5 corpectomy with posterior C2-T2 fusion completed on 3/9/23 followed by SBRT (24 Gy/2fx) to C4-C6 ending on 4/27/23.  The patient is currently being managing systemically with Letrozole/ribociclib under the direction of Dr. Kwon.   The patient continues to do well in follow-up.     PLAN:      --MRI C-Spine in 5 months.   --FU in Radiation Oncology in 5 months.     **Note that the patient is requesting FU imaging in 5 months.     Please reach out with any questions or concerns.     NCCN Guidelines were applicable to guide this patients treatment plan.  Raudel Whyte, BERNARDO-CNP

## 2024-06-20 ENCOUNTER — SPECIALTY PHARMACY (OUTPATIENT)
Dept: PHARMACY | Facility: CLINIC | Age: 79
End: 2024-06-20

## 2024-06-20 PROCEDURE — RXMED WILLOW AMBULATORY MEDICATION CHARGE

## 2024-06-21 ENCOUNTER — APPOINTMENT (OUTPATIENT)
Dept: RADIATION ONCOLOGY | Facility: CLINIC | Age: 79
End: 2024-06-21
Payer: MEDICARE

## 2024-06-25 ENCOUNTER — PHARMACY VISIT (OUTPATIENT)
Dept: PHARMACY | Facility: CLINIC | Age: 79
End: 2024-06-25
Payer: COMMERCIAL

## 2024-06-27 ENCOUNTER — HOSPITAL ENCOUNTER (OUTPATIENT)
Dept: RADIOLOGY | Facility: CLINIC | Age: 79
Discharge: HOME | End: 2024-06-27
Payer: MEDICARE

## 2024-06-27 ENCOUNTER — APPOINTMENT (OUTPATIENT)
Dept: RADIOLOGY | Facility: CLINIC | Age: 79
End: 2024-06-27
Payer: MEDICARE

## 2024-06-27 DIAGNOSIS — Z17.0 MALIGNANT NEOPLASM OF BREAST IN FEMALE, ESTROGEN RECEPTOR POSITIVE, UNSPECIFIED LATERALITY, UNSPECIFIED SITE OF BREAST (MULTI): ICD-10-CM

## 2024-06-27 DIAGNOSIS — C50.919 MALIGNANT NEOPLASM OF BREAST IN FEMALE, ESTROGEN RECEPTOR POSITIVE, UNSPECIFIED LATERALITY, UNSPECIFIED SITE OF BREAST (MULTI): ICD-10-CM

## 2024-06-27 PROCEDURE — A9552 F18 FDG: HCPCS | Performed by: STUDENT IN AN ORGANIZED HEALTH CARE EDUCATION/TRAINING PROGRAM

## 2024-06-27 PROCEDURE — 78815 PET IMAGE W/CT SKULL-THIGH: CPT | Mod: PS

## 2024-06-27 PROCEDURE — 3430000001 HC RX 343 DIAGNOSTIC RADIOPHARMACEUTICALS: Performed by: STUDENT IN AN ORGANIZED HEALTH CARE EDUCATION/TRAINING PROGRAM

## 2024-06-27 RX ORDER — FLUDEOXYGLUCOSE F 18 200 MCI/ML
13.7 INJECTION, SOLUTION INTRAVENOUS
Status: COMPLETED | OUTPATIENT
Start: 2024-06-27 | End: 2024-06-27

## 2024-07-01 ENCOUNTER — APPOINTMENT (OUTPATIENT)
Dept: HEMATOLOGY/ONCOLOGY | Facility: CLINIC | Age: 79
End: 2024-07-01
Payer: MEDICARE

## 2024-07-08 ENCOUNTER — OFFICE VISIT (OUTPATIENT)
Dept: HEMATOLOGY/ONCOLOGY | Facility: CLINIC | Age: 79
End: 2024-07-08
Payer: MEDICARE

## 2024-07-08 ENCOUNTER — SPECIALTY PHARMACY (OUTPATIENT)
Dept: HEMATOLOGY/ONCOLOGY | Facility: CLINIC | Age: 79
End: 2024-07-08

## 2024-07-08 ENCOUNTER — ONCOLOGY MEDICATION OUTREACH (OUTPATIENT)
Dept: HEMATOLOGY/ONCOLOGY | Facility: CLINIC | Age: 79
End: 2024-07-08

## 2024-07-08 VITALS
DIASTOLIC BLOOD PRESSURE: 74 MMHG | WEIGHT: 162.04 LBS | RESPIRATION RATE: 16 BRPM | BODY MASS INDEX: 32.73 KG/M2 | OXYGEN SATURATION: 95 % | HEART RATE: 58 BPM | SYSTOLIC BLOOD PRESSURE: 141 MMHG | TEMPERATURE: 97.7 F

## 2024-07-08 DIAGNOSIS — C50.919 MALIGNANT NEOPLASM OF BREAST IN FEMALE, ESTROGEN RECEPTOR POSITIVE, UNSPECIFIED LATERALITY, UNSPECIFIED SITE OF BREAST (MULTI): ICD-10-CM

## 2024-07-08 DIAGNOSIS — Z17.0 MALIGNANT NEOPLASM OF BREAST IN FEMALE, ESTROGEN RECEPTOR POSITIVE, UNSPECIFIED LATERALITY, UNSPECIFIED SITE OF BREAST (MULTI): ICD-10-CM

## 2024-07-08 PROCEDURE — 1159F MED LIST DOCD IN RCRD: CPT | Performed by: STUDENT IN AN ORGANIZED HEALTH CARE EDUCATION/TRAINING PROGRAM

## 2024-07-08 PROCEDURE — 99214 OFFICE O/P EST MOD 30 MIN: CPT | Performed by: STUDENT IN AN ORGANIZED HEALTH CARE EDUCATION/TRAINING PROGRAM

## 2024-07-08 PROCEDURE — 1126F AMNT PAIN NOTED NONE PRSNT: CPT | Performed by: STUDENT IN AN ORGANIZED HEALTH CARE EDUCATION/TRAINING PROGRAM

## 2024-07-08 ASSESSMENT — PAIN SCALES - GENERAL: PAINLEVEL: 0-NO PAIN

## 2024-07-08 NOTE — PROGRESS NOTES
Breast Medical Oncology Clinic  Location: [place]    Visit Type: Follow-up Visit    ONC History:     Mammogram 8/2022 No mammographic evidence of malignancy. Continued screening with annual  mammograms is recommended.   Patient lives in [place] and was in her usual state of health until she noticed BL shoulder pain in December.   She saw her PCP who obtained plain film imaging which reflexed to MRI. C spine MRI showed C5 vertebral destruction as well as additional C  spine lesions.    3/2/2023 MRI C spine: 1. Pathologic fracture at C5 with near complete vertebral body height loss and posterior cortex bowing resulting in severe canal stenosis at C4-5 and C5-6. Abnormal enhancing tissue fills the bilateral neural foramen at C4-5 and  C5-6 and causes severe bilateral foraminal stenosis, likely neoplastic. Findings are most consistent with multifocal osseous metastatic disease involving at least C1, C4, C5, and C6. Multilevel degenerative changes with additional moderate canal stenosis  at C6-7.   3/3/2023 CT C spine: Near complete destruction of the C5 vertebral body with abnormal epidural tissue resulting in the known severe canal stenosis and neural foraminal stenosis as seen on prior MRI. Destructive lesions involving C1 and C4 through C6 consistent  with a neoplastic process. Findings are similar to yesterday's MRI allowing for differences in technique.   3/3/2023 CT C/A/P: An enlarged bilobed right axillary lymph node with a fatty hilum superiorly measures approximately 1.8 x 1.5 x 3.1 cm (AP x TRV x CC). This appears to have mild surrounding inflammatory changes. No left axillary lymphadenopathy is identified.  No mediastinal or hilar lymphadenopathy.   3/4/2023 Bone scan Diffuse mild abnormal uptake at the level C5 involving the remainder of the vertebral body and the surrounding soft tissues, this could be infectious or neoplastic.   3/5/2023 MRI T spine: Inferior T7 and superior T9 lesions. The T7  "lesion is likely Schmorl's node formation, but the T9 lesion has an appearance at least suggestive of marrow replacement, and metastatic disease is in the differential diagnosis   3/5/2023 She was transferred to [facility] for further care.   3/6/2023: R axillary LN bx: Metastatic breast cancer, ER positive >95%, MT positive 1-5%, HER2 negative (1+)   3/9/2023: C5 corpectomy, strut graft, C2-T2 fusion, C4-6 decompression on 3/10 as well as R axillary gabbi biopsy with pathology returning as ISABEL+ adenocarcinoma likely breast in origin.    3/14/2023: Started on letrozole   4/11/23: Started kisqali   4/25-4/26: SBRT to C4-6 metastatic disease      Subjective: Interval History    Denies interval events since last follow-up- no recent hospitalizations, new medical diagnoses, or new medications.     Doing well with the medications- no problems.    No pain.     Neck ROM increased.    No headaches or blurry vision.    Planned cataract removal this summer.     Appetite and energy good.    Planning on selling her home and moving into a [employer] in August.    In good spirits.       GYN History/Pertinent Family history:    Family History: Father with prostate cancer and mets to brain, sister with breast cancer dx at 55.     Social History: Lives alone in [place], remote 12 pack year smoking hx. Rare EtOH. Niece Cindy Chowdhury is NOK/HCPOA.     Social History  Iva Colby \"Deepthi\"  reports that she has never smoked. She has never used smokeless tobacco.  She  reports that she does not currently use alcohol.  She  reports no history of drug use.    ROS:     Review of Systems   All other systems reviewed and are negative.       Physical Examination:    /74   Pulse 58   Temp 36.5 °C (97.7 °F)   Resp 16   Wt 73.5 kg (162 lb 0.6 oz)   SpO2 95%   BMI 32.73 kg/m²     Physical Exam  Vitals and nursing note reviewed.   Constitutional:       General: She is not in acute distress.     Appearance: Normal appearance. She is not " toxic-appearing.   HENT:      Head: Normocephalic and atraumatic.      Mouth/Throat:      Pharynx: Oropharynx is clear.   Eyes:      Extraocular Movements: Extraocular movements intact.      Conjunctiva/sclera: Conjunctivae normal.   Cardiovascular:      Rate and Rhythm: Normal rate and regular rhythm.   Pulmonary:      Effort: Pulmonary effort is normal. No respiratory distress.   Abdominal:      General: Abdomen is flat. Bowel sounds are normal.      Palpations: Abdomen is soft.   Musculoskeletal:         General: No swelling. Normal range of motion.      Cervical back: Normal range of motion.   Skin:     General: Skin is warm and dry.   Neurological:      General: No focal deficit present.      Mental Status: She is alert.   Psychiatric:         Mood and Affect: Mood normal.       Breast Examination:    Deferred    ECOG Performance Status:     [] 0 Fully active, able to carry on all pre-disease performance without restriction  [x] 1 Restricted in physically strenuous activity but ambulatory and able to carry out work of a light or sedentary nature, e.g., light house work, office work  [] 2 Ambulatory and capable of all selfcare but unable to carry out any work activities; up and about more than 50% of waking hours  [] 3 Capable of only limited selfcare; confined to bed or chair more than 50% of waking hours  [] 4 Completely disabled; cannot carry on any selfcare; totally confined to bed or chair  [] 5 Dead     Results:    Labs:  No new pertinent results since last visit    Imaging:  Reviewed 6/27/2024 PET scan:   1. No PET evidence of FDG avid locoregional or metastatic disease  throughout the body.  2. Stable sclerotic lesions within the axial skeleton without  significant FDG avidity, likely representing treated disease.    Pathology:  No new pertinent results since last visit    Assessment:     [place] metastatic breast cancer with bone only involvement, initial presentation 3/2023 with cervical spine  fracture; ER/MO positive, HER2 negative (1+ on LN,  0 on bone bx). S/p C5 corpectomy, strut graft, C2-T2 fusion, C4-6 decompression on 3/10/23 with C4-6 SBRT in April 2023. On letrozole and  kisqali.     Iva is a very pleasant post-menopausal woman with newly diagnosed breast cancer with history of hypothyroidism. Tolerating treatment exceedingly well. PET scan reviewed and shows no evidence FDG avid disease.     Plan:    Surgical Plan: Not indicated  Additional biopsy: Not indicated  Radiation Plan: C4-6 SBRT in April 2023.  Additional staging scans/DEXA/echo: Staging scans reviewed  Additional Path info (i.e Ki67, PDL1): MSI high not detected. CCND1 amplification, FGFR1  amplification, TP53 p.R282G ([id number] c.[id number]). Negative for BRAF V600E, PIK3CA and ESR1.   Gene assays:  Not indicated     Systemic treatment plan: Letrozole and ribociclib                   Intent: Palliative, discussed in detail with patient. She has forgotten several times during out encounters. We discussed extensively that stage IV disease and metastatic disease are interchangeable terms.                 Clinical trial: N/A                Endocrine therapy: Letrozole                HER2 treatment: Not indicated                Targeted agents: Ribociclib as above                Chemotherapy: Not indicated                BMA: We discussed the role of Denosumab (allergy to fosamax) in light of osseous disease to minimize the risk and delay skeletal related events. Patient previously declined. Given JENNIFER on recent PET we will defer this for now.      Access: Not indicated  Supportive meds: Not indicated this visit  Genetic testing: Declined, Will discuss again at a future visit  Fertility preservation: Not indicated  Other active problems/orders:      Limited ROM of neck: S/p C5 corpectomy, strut graft, C2-T2 fusion, C4-6 decompression on 3/10/23 with C4-6 SBRT. Will connect patient with Chinyere Bermudez for discussion regarding massage therapy at  [place] she follows with neurosurgery and radiation oncology with MRI of cervical spine       Surveillance plan:  Pet in 5 months per patient request to push back imaging; every 3 month labs and TM given stable findings.      Follow-up:  5 months after restaging scans for review. Patient instructed to call between visits if any clinical change.     Patient expressed understanding of the plan outlined above. She had ample time to ask questions. She understands she can contact us should she have additional questions or issues arise in the interim.

## 2024-07-19 ENCOUNTER — SPECIALTY PHARMACY (OUTPATIENT)
Dept: PHARMACY | Facility: CLINIC | Age: 79
End: 2024-07-19

## 2024-07-19 PROCEDURE — RXMED WILLOW AMBULATORY MEDICATION CHARGE

## 2024-07-23 ENCOUNTER — PHARMACY VISIT (OUTPATIENT)
Dept: PHARMACY | Facility: CLINIC | Age: 79
End: 2024-07-23
Payer: COMMERCIAL

## 2024-08-14 ENCOUNTER — SPECIALTY PHARMACY (OUTPATIENT)
Dept: PHARMACY | Facility: CLINIC | Age: 79
End: 2024-08-14

## 2024-08-14 PROCEDURE — RXMED WILLOW AMBULATORY MEDICATION CHARGE

## 2024-08-20 ENCOUNTER — PHARMACY VISIT (OUTPATIENT)
Dept: PHARMACY | Facility: CLINIC | Age: 79
End: 2024-08-20
Payer: COMMERCIAL

## 2024-09-04 ENCOUNTER — SPECIALTY PHARMACY (OUTPATIENT)
Dept: PHARMACY | Facility: CLINIC | Age: 79
End: 2024-09-04

## 2024-09-04 DIAGNOSIS — Z17.0 MALIGNANT NEOPLASM OF BREAST IN FEMALE, ESTROGEN RECEPTOR POSITIVE, UNSPECIFIED LATERALITY, UNSPECIFIED SITE OF BREAST (MULTI): ICD-10-CM

## 2024-09-04 DIAGNOSIS — C50.919 MALIGNANT NEOPLASM OF BREAST IN FEMALE, ESTROGEN RECEPTOR POSITIVE, UNSPECIFIED LATERALITY, UNSPECIFIED SITE OF BREAST (MULTI): ICD-10-CM

## 2024-09-06 PROCEDURE — RXMED WILLOW AMBULATORY MEDICATION CHARGE

## 2024-09-11 ENCOUNTER — SPECIALTY PHARMACY (OUTPATIENT)
Dept: PHARMACY | Facility: CLINIC | Age: 79
End: 2024-09-11

## 2024-09-17 ENCOUNTER — PHARMACY VISIT (OUTPATIENT)
Dept: PHARMACY | Facility: CLINIC | Age: 79
End: 2024-09-17
Payer: COMMERCIAL

## 2024-10-09 ENCOUNTER — SPECIALTY PHARMACY (OUTPATIENT)
Dept: PHARMACY | Facility: CLINIC | Age: 79
End: 2024-10-09

## 2024-10-09 PROCEDURE — RXMED WILLOW AMBULATORY MEDICATION CHARGE

## 2024-10-17 ENCOUNTER — PHARMACY VISIT (OUTPATIENT)
Dept: PHARMACY | Facility: CLINIC | Age: 79
End: 2024-10-17
Payer: COMMERCIAL

## 2024-10-18 ENCOUNTER — SPECIALTY PHARMACY (OUTPATIENT)
Dept: PHARMACY | Facility: CLINIC | Age: 79
End: 2024-10-18

## 2024-11-08 ENCOUNTER — SPECIALTY PHARMACY (OUTPATIENT)
Dept: PHARMACY | Facility: CLINIC | Age: 79
End: 2024-11-08

## 2024-11-08 PROCEDURE — RXMED WILLOW AMBULATORY MEDICATION CHARGE

## 2024-11-13 ENCOUNTER — PHARMACY VISIT (OUTPATIENT)
Dept: PHARMACY | Facility: CLINIC | Age: 79
End: 2024-11-13
Payer: COMMERCIAL

## 2024-11-29 ENCOUNTER — APPOINTMENT (OUTPATIENT)
Dept: RADIATION ONCOLOGY | Facility: CLINIC | Age: 79
End: 2024-11-29

## 2024-12-02 ENCOUNTER — TELEPHONE (OUTPATIENT)
Dept: RADIATION ONCOLOGY | Facility: HOSPITAL | Age: 79
End: 2024-12-02
Payer: MEDICARE

## 2024-12-02 NOTE — TELEPHONE ENCOUNTER
Called pt to remind of FUV  appointment on 12/03/24 at 1:00. Pt's phone went to voicemail left number if needs to reschedule.

## 2024-12-03 ENCOUNTER — HOSPITAL ENCOUNTER (OUTPATIENT)
Dept: RADIATION ONCOLOGY | Facility: HOSPITAL | Age: 79
Setting detail: RADIATION/ONCOLOGY SERIES
Discharge: HOME | End: 2024-12-03
Payer: MEDICARE

## 2024-12-03 ENCOUNTER — HOSPITAL ENCOUNTER (OUTPATIENT)
Dept: RADIOLOGY | Facility: HOSPITAL | Age: 79
Discharge: HOME | End: 2024-12-03
Payer: MEDICARE

## 2024-12-03 VITALS
OXYGEN SATURATION: 97 % | RESPIRATION RATE: 18 BRPM | TEMPERATURE: 96.1 F | HEART RATE: 57 BPM | BODY MASS INDEX: 32.32 KG/M2 | DIASTOLIC BLOOD PRESSURE: 59 MMHG | SYSTOLIC BLOOD PRESSURE: 135 MMHG | WEIGHT: 160 LBS

## 2024-12-03 DIAGNOSIS — C79.51 METASTASIS TO SPINAL COLUMN (MULTI): ICD-10-CM

## 2024-12-03 DIAGNOSIS — C50.919 CARCINOMA OF BREAST METASTATIC TO MULTIPLE SITES, UNSPECIFIED LATERALITY (MULTI): ICD-10-CM

## 2024-12-03 PROCEDURE — 72156 MRI NECK SPINE W/O & W/DYE: CPT

## 2024-12-03 PROCEDURE — 99214 OFFICE O/P EST MOD 30 MIN: CPT

## 2024-12-03 PROCEDURE — 72156 MRI NECK SPINE W/O & W/DYE: CPT | Performed by: RADIOLOGY

## 2024-12-03 PROCEDURE — 2550000001 HC RX 255 CONTRASTS

## 2024-12-03 PROCEDURE — A9575 INJ GADOTERATE MEGLUMI 0.1ML: HCPCS

## 2024-12-03 RX ORDER — GADOTERATE MEGLUMINE 376.9 MG/ML
15 INJECTION INTRAVENOUS
Status: COMPLETED | OUTPATIENT
Start: 2024-12-03 | End: 2024-12-03

## 2024-12-03 ASSESSMENT — ENCOUNTER SYMPTOMS
SPEECH DIFFICULTY: 0
HEMATURIA: 0
FEVER: 0
NERVOUS/ANXIOUS: 0
NECK PAIN: 0
OCCASIONAL FEELINGS OF UNSTEADINESS: 0
FATIGUE: 0
NAUSEA: 0
LIGHT-HEADEDNESS: 0
CHEST TIGHTNESS: 0
BLOOD IN STOOL: 0
VOMITING: 0
DEPRESSION: 0
NUMBNESS: 0
DIFFICULTY URINATING: 0
DIZZINESS: 0
HEMOPTYSIS: 0
DIARRHEA: 0
COUGH: 0
LEG SWELLING: 0
LOSS OF SENSATION IN FEET: 0
CONSTIPATION: 0
CONFUSION: 0
ADENOPATHY: 0
WHEEZING: 0
CHILLS: 0
SHORTNESS OF BREATH: 0
EYE PROBLEMS: 0
ABDOMINAL DISTENTION: 0
TROUBLE SWALLOWING: 0
SEIZURES: 0
BACK PAIN: 0
NECK STIFFNESS: 1
ABDOMINAL PAIN: 0
HEADACHES: 0

## 2024-12-03 ASSESSMENT — COLUMBIA-SUICIDE SEVERITY RATING SCALE - C-SSRS
2. HAVE YOU ACTUALLY HAD ANY THOUGHTS OF KILLING YOURSELF?: NO
1. IN THE PAST MONTH, HAVE YOU WISHED YOU WERE DEAD OR WISHED YOU COULD GO TO SLEEP AND NOT WAKE UP?: NO
6. HAVE YOU EVER DONE ANYTHING, STARTED TO DO ANYTHING, OR PREPARED TO DO ANYTHING TO END YOUR LIFE?: NO

## 2024-12-03 ASSESSMENT — PAIN SCALES - GENERAL: PAINLEVEL_OUTOF10: 0-NO PAIN

## 2024-12-03 NOTE — PROGRESS NOTES
Radiation Oncology Follow-Up    Patient Name:  Iva Colby  MRN:  [id number]  :  1945    Referring Provider: Raudel Whyte, *  Primary Care Provider: Zelalem Zamora MD  Care Team: Patient Care Team:  Zelalem Zamora MD as PCP - General  Pascale Kwon MD as Consulting Physician (Hematology and Oncology)    Date of Service: 12/3/2024     Diagnosis:    Metastatic breast cancer to C4-C6 s/p operative decompression  Diagnosis Code: C79.51    Most recent radiation therapy: The patient received spine SBRT (24 Gy/2 fx) to her C4-C6 metastatic disease from 23-23.     Recent Imaging:   10/30/23 PET scan:   1. No PET evidence of recurrent disease in the breasts.  2. No PET evidence of gabbi metastasis.  3.  Non FDG avid sclerotic lesions of the right anterior 3rd rib, T7,  and C1, likely representing treated bone metastases  4. FDG avid degenerative changes of the right femoroacetabular joint,  likely inflammatory in nature.    SUBJECTIVE  History of Present Illness:   Deepthi Colby is a 79 y.o. female who was previously seen at the [facility] Department of Radiation Oncology for metastatic breast cancer who developed C4-C6 putative metastatic disease.  Treatment included an anterior C5 corpectomy with posterior C2-T2 fusion completed on 3/9/23 followed by SBRT (24 Gy/2fx) to C4-C6 ending on 23.  The patient is currently be managing systemically with Letrozole/ribociclib under the direction of Dr. Kwon.   A restaging PET completed on 10/30/23 showed stable disease.  Today the patient states that he's doing great!  Only complaint is some stiffness in her neck which has remained stable.  Denies chills, fever, fatigue, SOB or chest pain.  Denies headaches, spinal pain, hearing changes or focal sensory/neuro changes.  Does endorses some vision changes related to cataracts.  Denies abdominal pain, nausea, vomiting, diarrhea or constipation.  An MRI of the  cervical completed today shows no evidence of disease.  This was shared with the patient and her daughter.     6/17/24 Interval Visit:   Patient is in clinic today for a routine Radiation Oncology FU visit and review of an MRI of the C-spine.  Today the patient states that she's doing very well!  Continues to c/o some decreased ROM in her neck.  We discussed a referral to PT and the patient declined at this time.  Also endorse some weakness in her upper arms, since surgery, that has remained stable.  She continue to do strengthening exercises on a daily basis.   Denies chills, fever, fatigue, SOB or chest pain.  Denies headaches, spinal pain, hearing changes or focal sensory/neuro changes.  Does endorses some vision changes related to cataracts.  Denies abdominal pain, nausea, vomiting, diarrhea or constipation.  An MRI of the cervical completed today shows stable disease.  This was shared with the patient.     12/3/24 Interval FU visit:   Today the patient is in clinic for a routine Radiation Oncology FU visit and review of an MRI of the C-spine.  Overall, the patient states that she's doing very well.  She continues to have some occasional cervical discomfort with ROM.  She doesn't want PT at this time but will be joining the [facility] to work on strength and stretching.  Denies chills, fever, fatigue, SOB or chest pain.  Denies headaches, spinal pain, hearing changes or focal sensory/neuro changes.  Recently had b/l cataract surgery which has improved her vision a great deal.   Denies abdominal pain, nausea, vomiting, diarrhea or constipation.     An MRI of the C-spine completed today shows Unchanged T1 and T2 hypointense lesion located within the anterior arch of C1 and the left lateral mass of C1. No new suspicious lesions are seen within the visualized osseous structures. Additional stable findings as above.    Treatment Rendered:   Radiation Treatments       No radiation treatments to show. (Treatments may have  "been administered in another system.)            Review of Systems:   Review of Systems   Constitutional:  Negative for chills, fatigue and fever.   HENT:   Negative for hearing loss, lump/mass and trouble swallowing.    Eyes:  Negative for eye problems.   Respiratory:  Negative for chest tightness, cough, hemoptysis, shortness of breath and wheezing.    Cardiovascular:  Negative for chest pain and leg swelling.   Gastrointestinal:  Negative for abdominal distention, abdominal pain, blood in stool, constipation, diarrhea, nausea and vomiting.   Genitourinary:  Negative for bladder incontinence, difficulty urinating and hematuria.    Musculoskeletal:  Positive for neck stiffness. Negative for back pain, gait problem and neck pain.   Neurological:  Negative for dizziness, gait problem, headaches, light-headedness, numbness, seizures and speech difficulty.   Hematological:  Negative for adenopathy.   Psychiatric/Behavioral:  Negative for confusion and depression. The patient is not nervous/anxious.      The patient's current pain level was assessed.  They report currently having a pain of 0 out of 10.  They feel their pain is under control without the use of pain medications.    Performance Status:   The Karnofsky performance scale today is 80-90%.      OBJECTIVE  Vital Signs:      11/6/2023     2:34 PM 2/12/2024    12:00 PM 3/11/2024     2:39 PM 6/17/2024    12:10 PM 7/8/2024     8:38 AM 12/3/2024    11:17 AM 12/3/2024    12:55 PM   Vitals   Systolic 136 139 153 113 141  135   Diastolic 75 78 84 71 74  59   Heart Rate 58 62 62 52 58  57   Temp 36.2 °C (97.2 °F) 36 °C (96.8 °F) 36.5 °C (97.7 °F) 36 °C (96.8 °F) 36.5 °C (97.7 °F)  35.6 °C (96.1 °F)   Resp 16 18 16 18 16  18   Height 1.449 m (4' 9.05\") 1.499 m (4' 11\")    1.499 m (4' 11\")    Weight (lb) 170.09 161.16 164.46 160.27 162.04 160 160   BMI 36.75 kg/m2 32.55 kg/m2 33.22 kg/m2 32.37 kg/m2 32.73 kg/m2 32.32 kg/m2 32.32 kg/m2   BSA (m2) 1.76 m2 1.74 m2 1.76 m2 " 1.74 m2 1.75 m2 1.74 m2 1.74 m2   Visit Report Report  Report  Report        Physical Exam:  Physical Exam  Constitutional:       General: She is not in acute distress.     Appearance: Normal appearance. She is normal weight. She is not ill-appearing, toxic-appearing or diaphoretic.   HENT:      Head: Normocephalic and atraumatic.      Jaw: No trismus.      Nose: Nose normal. No congestion or rhinorrhea.      Mouth/Throat:      Mouth: Mucous membranes are moist.   Eyes:      General: Lids are normal. Gaze aligned appropriately.      Extraocular Movements: Extraocular movements intact.      Pupils: Pupils are equal, round, and reactive to light.   Neck:      Thyroid: No thyroid mass or thyroid tenderness.   Cardiovascular:      Rate and Rhythm: Normal rate and regular rhythm.      Pulses: Normal pulses.      Heart sounds: Normal heart sounds. No murmur heard.  Pulmonary:      Effort: Pulmonary effort is normal. No tachypnea or respiratory distress.      Breath sounds: Normal breath sounds. No wheezing or rhonchi.   Abdominal:      General: Abdomen is flat. Bowel sounds are normal. There is no distension.      Palpations: Abdomen is soft. There is no mass.      Tenderness: There is no abdominal tenderness. There is no guarding or rebound.   Musculoskeletal:         General: No swelling, tenderness, deformity or signs of injury. Normal range of motion.      Cervical back: Full passive range of motion without pain, normal range of motion and neck supple. No rigidity or tenderness. No pain with movement. Normal range of motion.      Right lower leg: No edema.      Left lower leg: No edema.   Lymphadenopathy:      Head:      Right side of head: No submental, submandibular, tonsillar, preauricular, posterior auricular or occipital adenopathy.      Left side of head: No submental, submandibular, tonsillar, preauricular, posterior auricular or occipital adenopathy.      Cervical:      Right cervical: No superficial, deep or  posterior cervical adenopathy.     Left cervical: No superficial, deep or posterior cervical adenopathy.   Skin:     General: Skin is warm and dry.      Capillary Refill: Capillary refill takes less than 2 seconds.      Coloration: Skin is not jaundiced.      Findings: No erythema, lesion or rash.   Neurological:      General: No focal deficit present.      Mental Status: She is alert and oriented to person, place, and time.      Cranial Nerves: No cranial nerve deficit.      Sensory: No sensory deficit.      Motor: No weakness.      Coordination: Coordination normal.      Gait: Gait normal.   Psychiatric:         Attention and Perception: Attention normal.         Mood and Affect: Mood normal.         Behavior: Behavior normal. Behavior is cooperative.     MR CERVICAL SPINE W AND WO IV CONTRAST; ; 12/3/2024 12:22 pm   IMPRESSION:  Unchanged T1 and T2 hypointense lesion located within the anterior  arch of C1 and the left lateral mass of C1. No new suspicious lesions  are seen within the visualized osseous structures. Additional stable  findings as above.    MR CERVICAL SPINE W AND WO IV CONTRAST; 6/17/2024 11:48 am   IMPRESSION:  Unchanged T1 and T2 hypointense signal on the left at C1. No new  focal lesions.  Postsurgical changes similar to the prior exam    ASSESSMENT:  79 y.o. female who was previously seen at the [facility] Department of Radiation Oncology for metastatic breast cancer who developed C4-C6 putative metastatic disease.  Treatment included an anterior C5 corpectomy with posterior C2-T2 fusion completed on 3/9/23 followed by SBRT (24 Gy/2fx) to C4-C6 ending on 4/27/23.  The patient is currently being managing systemically with Letrozole/ribociclib under the direction of Dr. Kwon.   The patient continues to do well in follow-up.     PLAN:      --MRI C-Spine in 6 months.   --FU in Radiation Oncology in 6 months.      Please reach out with any questions or concerns.      NCCN Guidelines were applicable to guide this patients treatment plan.  Raudel Whyte, APRN-CNP

## 2024-12-05 ENCOUNTER — SPECIALTY PHARMACY (OUTPATIENT)
Dept: PHARMACY | Facility: CLINIC | Age: 79
End: 2024-12-05

## 2024-12-05 PROCEDURE — RXMED WILLOW AMBULATORY MEDICATION CHARGE

## 2024-12-07 ENCOUNTER — PHARMACY VISIT (OUTPATIENT)
Dept: PHARMACY | Facility: CLINIC | Age: 79
End: 2024-12-07
Payer: COMMERCIAL

## 2024-12-12 ENCOUNTER — APPOINTMENT (OUTPATIENT)
Dept: RADIOLOGY | Facility: HOSPITAL | Age: 79
End: 2024-12-12
Payer: MEDICARE

## 2024-12-12 ENCOUNTER — TELEPHONE (OUTPATIENT)
Dept: HEMATOLOGY/ONCOLOGY | Facility: CLINIC | Age: 79
End: 2024-12-12
Payer: MEDICARE

## 2024-12-12 ENCOUNTER — APPOINTMENT (OUTPATIENT)
Dept: RADIOLOGY | Facility: CLINIC | Age: 79
End: 2024-12-12
Payer: MEDICARE

## 2024-12-12 ENCOUNTER — TELEPHONE (OUTPATIENT)
Dept: SCHEDULING | Age: 79
End: 2024-12-12
Payer: MEDICARE

## 2024-12-12 NOTE — TELEPHONE ENCOUNTER
PT called, pet scan cancelled by hospital and rescheduled for 12/19/25. Wants to cancel 12/16/24 doc appt here and reschedule for the following Monday after her pet scan.

## 2024-12-16 ENCOUNTER — APPOINTMENT (OUTPATIENT)
Dept: HEMATOLOGY/ONCOLOGY | Facility: CLINIC | Age: 79
End: 2024-12-16
Payer: MEDICARE

## 2024-12-19 ENCOUNTER — HOSPITAL ENCOUNTER (OUTPATIENT)
Dept: RADIOLOGY | Facility: HOSPITAL | Age: 79
Discharge: HOME | End: 2024-12-19
Payer: MEDICARE

## 2024-12-19 DIAGNOSIS — Z17.0 MALIGNANT NEOPLASM OF BREAST IN FEMALE, ESTROGEN RECEPTOR POSITIVE, UNSPECIFIED LATERALITY, UNSPECIFIED SITE OF BREAST: ICD-10-CM

## 2024-12-19 DIAGNOSIS — C50.919 MALIGNANT NEOPLASM OF BREAST IN FEMALE, ESTROGEN RECEPTOR POSITIVE, UNSPECIFIED LATERALITY, UNSPECIFIED SITE OF BREAST: ICD-10-CM

## 2024-12-19 PROCEDURE — A9552 F18 FDG: HCPCS | Performed by: STUDENT IN AN ORGANIZED HEALTH CARE EDUCATION/TRAINING PROGRAM

## 2024-12-19 PROCEDURE — 78815 PET IMAGE W/CT SKULL-THIGH: CPT | Mod: PS

## 2024-12-19 PROCEDURE — 3430000001 HC RX 343 DIAGNOSTIC RADIOPHARMACEUTICALS: Performed by: STUDENT IN AN ORGANIZED HEALTH CARE EDUCATION/TRAINING PROGRAM

## 2024-12-19 RX ORDER — FLUDEOXYGLUCOSE F 18 200 MCI/ML
13 INJECTION, SOLUTION INTRAVENOUS
Status: COMPLETED | OUTPATIENT
Start: 2024-12-19 | End: 2024-12-19

## 2024-12-24 ENCOUNTER — SPECIALTY PHARMACY (OUTPATIENT)
Dept: PHARMACY | Facility: CLINIC | Age: 79
End: 2024-12-24

## 2024-12-24 DIAGNOSIS — Z17.0 MALIGNANT NEOPLASM OF BREAST IN FEMALE, ESTROGEN RECEPTOR POSITIVE, UNSPECIFIED LATERALITY, UNSPECIFIED SITE OF BREAST: ICD-10-CM

## 2024-12-24 DIAGNOSIS — C50.919 MALIGNANT NEOPLASM OF BREAST IN FEMALE, ESTROGEN RECEPTOR POSITIVE, UNSPECIFIED LATERALITY, UNSPECIFIED SITE OF BREAST: ICD-10-CM

## 2024-12-26 PROCEDURE — RXMED WILLOW AMBULATORY MEDICATION CHARGE

## 2025-01-06 ENCOUNTER — OFFICE VISIT (OUTPATIENT)
Dept: HEMATOLOGY/ONCOLOGY | Facility: CLINIC | Age: 80
End: 2025-01-06
Payer: MEDICARE

## 2025-01-06 VITALS
WEIGHT: 159.39 LBS | TEMPERATURE: 97.5 F | SYSTOLIC BLOOD PRESSURE: 142 MMHG | DIASTOLIC BLOOD PRESSURE: 56 MMHG | HEART RATE: 60 BPM | BODY MASS INDEX: 32.19 KG/M2 | OXYGEN SATURATION: 98 % | RESPIRATION RATE: 16 BRPM

## 2025-01-06 DIAGNOSIS — Z17.0 MALIGNANT NEOPLASM OF BREAST IN FEMALE, ESTROGEN RECEPTOR POSITIVE, UNSPECIFIED LATERALITY, UNSPECIFIED SITE OF BREAST: ICD-10-CM

## 2025-01-06 DIAGNOSIS — C50.919 MALIGNANT NEOPLASM OF BREAST IN FEMALE, ESTROGEN RECEPTOR POSITIVE, UNSPECIFIED LATERALITY, UNSPECIFIED SITE OF BREAST: ICD-10-CM

## 2025-01-06 PROCEDURE — 99215 OFFICE O/P EST HI 40 MIN: CPT | Performed by: STUDENT IN AN ORGANIZED HEALTH CARE EDUCATION/TRAINING PROGRAM

## 2025-01-06 PROCEDURE — 1159F MED LIST DOCD IN RCRD: CPT | Performed by: STUDENT IN AN ORGANIZED HEALTH CARE EDUCATION/TRAINING PROGRAM

## 2025-01-06 PROCEDURE — 1126F AMNT PAIN NOTED NONE PRSNT: CPT | Performed by: STUDENT IN AN ORGANIZED HEALTH CARE EDUCATION/TRAINING PROGRAM

## 2025-01-06 ASSESSMENT — PAIN SCALES - GENERAL: PAINLEVEL_OUTOF10: 0-NO PAIN

## 2025-01-06 NOTE — PROGRESS NOTES
Breast Medical Oncology Clinic  Location: [place]    Visit Type: Follow-up Visit    ONC History:     Mammogram 8/2022 No mammographic evidence of malignancy. Continued screening with annual  mammograms is recommended.   Patient lives in [place] and was in her usual state of health until she noticed BL shoulder pain in December.   She saw her PCP who obtained plain film imaging which reflexed to MRI. C spine MRI showed C5 vertebral destruction as well as additional C  spine lesions.    3/2/2023 MRI C spine: 1. Pathologic fracture at C5 with near complete vertebral body height loss and posterior cortex bowing resulting in severe canal stenosis at C4-5 and C5-6. Abnormal enhancing tissue fills the bilateral neural foramen at C4-5 and  C5-6 and causes severe bilateral foraminal stenosis, likely neoplastic. Findings are most consistent with multifocal osseous metastatic disease involving at least C1, C4, C5, and C6. Multilevel degenerative changes with additional moderate canal stenosis  at C6-7.   3/3/2023 CT C spine: Near complete destruction of the C5 vertebral body with abnormal epidural tissue resulting in the known severe canal stenosis and neural foraminal stenosis as seen on prior MRI. Destructive lesions involving C1 and C4 through C6 consistent  with a neoplastic process. Findings are similar to yesterday's MRI allowing for differences in technique.   3/3/2023 CT C/A/P: An enlarged bilobed right axillary lymph node with a fatty hilum superiorly measures approximately 1.8 x 1.5 x 3.1 cm (AP x TRV x CC). This appears to have mild surrounding inflammatory changes. No left axillary lymphadenopathy is identified.  No mediastinal or hilar lymphadenopathy.   3/4/2023 Bone scan Diffuse mild abnormal uptake at the level C5 involving the remainder of the vertebral body and the surrounding soft tissues, this could be infectious or neoplastic.   3/5/2023 MRI T spine: Inferior T7 and superior T9 lesions. The T7  "lesion is likely Schmorl's node formation, but the T9 lesion has an appearance at least suggestive of marrow replacement, and metastatic disease is in the differential diagnosis   3/5/2023 She was transferred to [facility] for further care.   3/6/2023: R axillary LN bx: Metastatic breast cancer, ER positive >95%, NE positive 1-5%, HER2 negative (1+)   3/9/2023: C5 corpectomy, strut graft, C2-T2 fusion, C4-6 decompression on 3/10 as well as R axillary gabbi biopsy with pathology returning as ISABEL+ adenocarcinoma likely breast in origin.    3/14/2023: Started on letrozole   4/11/23: Started kisqali   4/25-4/26: SBRT to C4-6 metastatic disease      Subjective: Interval History    Denies interval events since last follow-up- no recent hospitalizations, new medical diagnoses, or new medications.     Doing well with the medications- no problems.    No pain.     No headaches or blurry vision.    Appetite and energy good.    In good spirits.     Plans to contact her gastroenterologist in [place], Dr. Prieto for follow-up.     GYN History/Pertinent Family history:    Family History: Father with prostate cancer and mets to brain, sister with breast cancer dx at 55.     Social History: Lives alone in [place], remote 12 pack year smoking hx. Rare EtOH. Niece Cindy Chowdhury is NOK/HCPOA.     Social History  Iva Colby \"Deepthi\"  reports that she has never smoked. She has never used smokeless tobacco.  She  reports that she does not currently use alcohol.  She  reports no history of drug use.    ROS:     Review of Systems   All other systems reviewed and are negative.       Physical Examination:    /56   Pulse 60   Temp 36.4 °C (97.5 °F)   Resp 16   Wt 72.3 kg (159 lb 6.3 oz)   SpO2 98%   BMI 32.19 kg/m²     Physical Exam  Vitals and nursing note reviewed.   Constitutional:       General: She is not in acute distress.     Appearance: Normal appearance. She is not toxic-appearing.   HENT:      Head: Normocephalic and " atraumatic.      Mouth/Throat:      Pharynx: Oropharynx is clear.   Eyes:      Extraocular Movements: Extraocular movements intact.      Conjunctiva/sclera: Conjunctivae normal.   Cardiovascular:      Rate and Rhythm: Normal rate and regular rhythm.   Pulmonary:      Effort: Pulmonary effort is normal. No respiratory distress.   Abdominal:      General: Abdomen is flat. Bowel sounds are normal.      Palpations: Abdomen is soft.   Musculoskeletal:         General: No swelling. Normal range of motion.      Cervical back: Normal range of motion.   Skin:     General: Skin is warm and dry.   Neurological:      General: No focal deficit present.      Mental Status: She is alert.   Psychiatric:         Mood and Affect: Mood normal.       Breast Examination:    Deferred    ECOG Performance Status:     [] 0 Fully active, able to carry on all pre-disease performance without restriction  [x] 1 Restricted in physically strenuous activity but ambulatory and able to carry out work of a light or sedentary nature, e.g., light house work, office work  [] 2 Ambulatory and capable of all selfcare but unable to carry out any work activities; up and about more than 50% of waking hours  [] 3 Capable of only limited selfcare; confined to bed or chair more than 50% of waking hours  [] 4 Completely disabled; cannot carry on any selfcare; totally confined to bed or chair  [] 5 Dead     Results:    Labs:  10/11/24:    WBC 2.5 , ANC 1.5  Hgb 11.6  Plt 211  CEA 1.6  [id number] 40.8      Imaging:  Reviewed 12/19/2024 PET    Pathology:  No new pertinent results since last visit    Assessment:     [place] metastatic breast cancer with bone only involvement, initial presentation 3/2023 with cervical spine fracture; ER/OR positive, HER2 negative (1+ on LN,  0 on bone bx). S/p C5 corpectomy, strut graft, C2-T2 fusion, C4-6 decompression on 3/10/23 with C4-6 SBRT in April 2023. On letrozole and  kisqali.     Iva is a very pleasant post-menopausal  woman with newly diagnosed breast cancer with history of hypothyroidism. Tolerating treatment exceedingly well. PET scan reviewed and shows no evidence of metastatic disease. There is note of Focal FDG uptake in the distal esophagus, favored to represent Esophagitis which we will monitor and she plans to discuss with her local gastroenterologist as well.     Plan:    Surgical Plan: Not indicated  Additional biopsy: Not indicated  Radiation Plan: C4-6 SBRT in April 2023.  Additional staging scans/DEXA/echo: Staging scans reviewed  Additional Path info (i.e Ki67, PDL1): MSI high not detected. CCND1 amplification, FGFR1  amplification, TP53 p.R282G ([id number] c.[id number]>G). Negative for BRAF V600E, PIK3CA and ESR1.   Gene assays:  Not indicated     Systemic treatment plan: Letrozole and ribociclib                   Intent: Palliative, discussed in detail with patient. She has forgotten several times during out encounters. We discussed extensively that stage IV disease and metastatic disease are interchangeable terms.                 Clinical trial: N/A                Endocrine therapy: Letrozole                HER2 treatment: Not indicated                Targeted agents: Ribociclib as above                Chemotherapy: Not indicated                BMA: We discussed the role of Denosumab (allergy to fosamax) in light of osseous disease to minimize the risk and delay skeletal related events. Patient previously declined. Given JENNIFER on recent PET we will defer this for now.      Access: Not indicated  Supportive meds: Not indicated this visit  Genetic testing: Declined, Will discuss again at a future visit  Fertility preservation: Not indicated  Other active problems/orders:      Limited ROM of neck: S/p C5 corpectomy, strut graft, C2-T2 fusion, C4-6 decompression on 3/10/23 with C4-6 SBRT. She follows with neurosurgery and radiation oncology with MRI of cervical spine    Focal FDG uptake in the distal esophagus, favored to  represent esophagitis: Will monitor on future imaging. She also plans to discuss this finding with her local gastroenterologist as she reports she may be due for colonscopy       Surveillance plan:  Pet in 5-6 months per patient request to push back imaging; every 3 month labs and TM given stable findings.      Follow-up:  June 2025 for scan review     Patient expressed understanding of the plan outlined above. She had ample time to ask questions. She understands she can contact us should she have additional questions or issues arise in the interim.

## 2025-01-06 NOTE — PROGRESS NOTES
FUV completed. POC reviewed with pt. who verbalizes understanding per teach back method. Pt agreeable to call our office for any questions, issues, or concerns. Next FUV with Dr Kwon second week of June following repeat scans. Pt given print out orders for labs and PET scan orders, and Pt to scheduling prior to discharge from office.

## 2025-01-07 ENCOUNTER — ONCOLOGY MEDICATION OUTREACH (OUTPATIENT)
Dept: HEMATOLOGY/ONCOLOGY | Facility: CLINIC | Age: 80
End: 2025-01-07
Payer: MEDICARE

## 2025-01-07 ENCOUNTER — SPECIALTY PHARMACY (OUTPATIENT)
Dept: HEMATOLOGY/ONCOLOGY | Facility: CLINIC | Age: 80
End: 2025-01-07
Payer: MEDICARE

## 2025-01-08 ENCOUNTER — PHARMACY VISIT (OUTPATIENT)
Dept: PHARMACY | Facility: CLINIC | Age: 80
End: 2025-01-08
Payer: COMMERCIAL

## 2025-01-10 ENCOUNTER — TELEPHONE (OUTPATIENT)
Dept: ADMISSION | Facility: HOSPITAL | Age: 80
End: 2025-01-10
Payer: MEDICARE

## 2025-01-10 NOTE — TELEPHONE ENCOUNTER
I explained to her when I read your note and she is now understanding she needs to see GI. She already has an upcoming appointment with them. Thank you.

## 2025-01-10 NOTE — TELEPHONE ENCOUNTER
Iva called and said she is seeing her Gastro Dr. Pastrana on 2/3 and thought Dr. Kwon was putting in an order for an endoscopy to be done for her. Note below from visit on 1/6/25:  I told her from office note it looks like you were going to consult with Gastroenterology since also may be due for a colonoscopy. She said she will consult with them about an order but was upset an order wasn't placed after her last appt.     Focal FDG uptake in the distal esophagus, favored to represent esophagitis: Will monitor on future imaging. She also plans to discuss this finding with her local gastroenterologist as she reports she may be due for colonscopy.

## 2025-02-04 ENCOUNTER — SPECIALTY PHARMACY (OUTPATIENT)
Dept: PHARMACY | Facility: CLINIC | Age: 80
End: 2025-02-04

## 2025-02-04 PROCEDURE — RXMED WILLOW AMBULATORY MEDICATION CHARGE

## 2025-02-05 ENCOUNTER — SPECIALTY PHARMACY (OUTPATIENT)
Dept: PHARMACY | Facility: CLINIC | Age: 80
End: 2025-02-05

## 2025-02-07 ENCOUNTER — PHARMACY VISIT (OUTPATIENT)
Dept: PHARMACY | Facility: CLINIC | Age: 80
End: 2025-02-07
Payer: COMMERCIAL

## 2025-02-10 ENCOUNTER — SPECIALTY PHARMACY (OUTPATIENT)
Dept: PHARMACY | Facility: CLINIC | Age: 80
End: 2025-02-10

## 2025-02-19 ENCOUNTER — TELEPHONE (OUTPATIENT)
Dept: ADMISSION | Facility: HOSPITAL | Age: 80
End: 2025-02-19
Payer: MEDICARE

## 2025-02-19 NOTE — TELEPHONE ENCOUNTER
Deepthi going to a [facility] lab in [place] for upcoming labs prior to her June FUV.   She is asking if we can mail her a copy of her lab orders. She prefers to have a paper copy vs orders in EPIC.  Please ensure these are entered as [facility].    She would like a call once these are mailed out.    No further questions or concerns at this time.

## 2025-03-04 ENCOUNTER — SPECIALTY PHARMACY (OUTPATIENT)
Dept: PHARMACY | Facility: CLINIC | Age: 80
End: 2025-03-04

## 2025-03-04 PROCEDURE — RXMED WILLOW AMBULATORY MEDICATION CHARGE

## 2025-03-05 ENCOUNTER — PHARMACY VISIT (OUTPATIENT)
Dept: PHARMACY | Facility: CLINIC | Age: 80
End: 2025-03-05
Payer: COMMERCIAL

## 2025-03-17 DIAGNOSIS — Z17.0 MALIGNANT NEOPLASM OF BREAST IN FEMALE, ESTROGEN RECEPTOR POSITIVE, UNSPECIFIED LATERALITY, UNSPECIFIED SITE OF BREAST: ICD-10-CM

## 2025-03-17 DIAGNOSIS — C50.919 MALIGNANT NEOPLASM OF BREAST IN FEMALE, ESTROGEN RECEPTOR POSITIVE, UNSPECIFIED LATERALITY, UNSPECIFIED SITE OF BREAST: ICD-10-CM

## 2025-03-17 RX ORDER — LETROZOLE 2.5 MG/1
2.5 TABLET, FILM COATED ORAL DAILY
Qty: 90 TABLET | Refills: 4 | Status: SHIPPED | OUTPATIENT
Start: 2025-03-17

## 2025-03-28 PROCEDURE — RXMED WILLOW AMBULATORY MEDICATION CHARGE

## 2025-03-31 ENCOUNTER — SPECIALTY PHARMACY (OUTPATIENT)
Dept: PHARMACY | Facility: CLINIC | Age: 80
End: 2025-03-31

## 2025-04-04 ENCOUNTER — SPECIALTY PHARMACY (OUTPATIENT)
Dept: PHARMACY | Facility: CLINIC | Age: 80
End: 2025-04-04

## 2025-04-07 ENCOUNTER — PHARMACY VISIT (OUTPATIENT)
Dept: PHARMACY | Facility: CLINIC | Age: 80
End: 2025-04-07
Payer: COMMERCIAL

## 2025-04-28 ENCOUNTER — SPECIALTY PHARMACY (OUTPATIENT)
Dept: PHARMACY | Facility: CLINIC | Age: 80
End: 2025-04-28

## 2025-04-28 DIAGNOSIS — Z17.0 MALIGNANT NEOPLASM OF BREAST IN FEMALE, ESTROGEN RECEPTOR POSITIVE, UNSPECIFIED LATERALITY, UNSPECIFIED SITE OF BREAST: ICD-10-CM

## 2025-04-28 DIAGNOSIS — C50.919 MALIGNANT NEOPLASM OF BREAST IN FEMALE, ESTROGEN RECEPTOR POSITIVE, UNSPECIFIED LATERALITY, UNSPECIFIED SITE OF BREAST: ICD-10-CM

## 2025-04-28 PROCEDURE — RXMED WILLOW AMBULATORY MEDICATION CHARGE

## 2025-04-28 NOTE — TELEPHONE ENCOUNTER
Reason for Conversation  Med Refill    Background   Requested Prescriptions     Signed Prescriptions Disp Refills    ribociclib (Kisqali) 3 x 200 mg tablet therapy pack 63 each 3     Sig: TAKE THREE (3) TABLETS BY MOUTH ONCE DAILY FOR 21 DAYS, THEN OFF FOR 7 DAYS.     Authorizing Provider: DENISHA SOW     Ordering User: JENNA LINO        Disposition   No disposition on file.

## 2025-04-30 ENCOUNTER — PHARMACY VISIT (OUTPATIENT)
Dept: PHARMACY | Facility: CLINIC | Age: 80
End: 2025-04-30
Payer: COMMERCIAL

## 2025-05-12 ENCOUNTER — TELEPHONE (OUTPATIENT)
Dept: HEMATOLOGY/ONCOLOGY | Facility: CLINIC | Age: 80
End: 2025-05-12
Payer: MEDICARE

## 2025-05-12 NOTE — TELEPHONE ENCOUNTER
Patient at [facility] and needs lab orders.  She states they were supposed to be standing.  Let me know when completed, and I will fax to [phone].

## 2025-05-13 ENCOUNTER — ONCOLOGY MEDICATION OUTREACH (OUTPATIENT)
Dept: HEMATOLOGY/ONCOLOGY | Facility: CLINIC | Age: 80
End: 2025-05-13
Payer: MEDICARE

## 2025-05-13 NOTE — PROGRESS NOTES
09/12/20 1155   Patient Data   Vt (observed, mL) 413 mL   Vt Exp (mL) 305 mL   Minute Ventilation (L/min) 9.7 L/min   Total Resp Rate  22 BPM   PIP Observed (cm H2O) 35 cm H2O   MAP (cm H2O) 14   Auto/Intrinsic PEEP Observed (cm H2O) 4 cm H2O   Plateau Pressure (cm H2O) 26 cm H2O   Static Compliance (L/cm H2O) 24   Dynamic Compliance (L/cm H2O) 17 L/cm H2O   Airway Resistance 16      Labs good to continue Kisqali. Next labs due 8/4/25

## 2025-05-22 ENCOUNTER — TELEPHONE (OUTPATIENT)
Dept: RADIATION ONCOLOGY | Facility: HOSPITAL | Age: 80
End: 2025-05-22
Payer: MEDICARE

## 2025-05-22 NOTE — TELEPHONE ENCOUNTER
Called pt to remind of FUV appointment on 05/28/25 at 1:30. Pt's phone went to voicemail left number if needs to reschedule.

## 2025-05-23 PROCEDURE — RXMED WILLOW AMBULATORY MEDICATION CHARGE

## 2025-05-27 ASSESSMENT — ENCOUNTER SYMPTOMS
DIZZINESS: 0
EYE PROBLEMS: 0
CONFUSION: 0
HEADACHES: 0
FATIGUE: 0
DEPRESSION: 0
CHEST TIGHTNESS: 0
CHILLS: 0
BACK PAIN: 0
COUGH: 0
LEG SWELLING: 0
SHORTNESS OF BREATH: 0
ABDOMINAL PAIN: 0
FEVER: 0
SPEECH DIFFICULTY: 0
LIGHT-HEADEDNESS: 0
HEMATURIA: 0
SEIZURES: 0
NAUSEA: 0
NERVOUS/ANXIOUS: 0
VOMITING: 0
DIARRHEA: 0
ADENOPATHY: 0
NECK STIFFNESS: 1
NUMBNESS: 0
ABDOMINAL DISTENTION: 0
HEMOPTYSIS: 0
WHEEZING: 0
BLOOD IN STOOL: 0
TROUBLE SWALLOWING: 0
DIFFICULTY URINATING: 0
CONSTIPATION: 0
NECK PAIN: 0

## 2025-05-27 NOTE — PROGRESS NOTES
Radiation Oncology Follow-Up    Patient Name:  Iva Colby  MRN:  [id number]  :  1945    Referring Provider: Raudel Whyte, *  Primary Care Provider: Zelalem Zamora MD  Care Team: Patient Care Team:  Zelalem Zamora MD as PCP - General  Pascale Kwon MD as Consulting Physician (Hematology and Oncology)    Date of Service: 2025     Diagnosis:    Metastatic breast cancer to C4-C6 s/p operative decompression  Diagnosis Code: C79.51    Most recent radiation therapy: The patient received spine SBRT (24 Gy/2 fx) to her C4-C6 metastatic disease from 23-23.     Recent Imaging:   10/30/23 PET scan:   1. No PET evidence of recurrent disease in the breasts.  2. No PET evidence of gabbi metastasis.  3.  Non FDG avid sclerotic lesions of the right anterior 3rd rib, T7,  and C1, likely representing treated bone metastases  4. FDG avid degenerative changes of the right femoroacetabular joint,  likely inflammatory in nature.    SUBJECTIVE  History of Present Illness:   Deepthi Colby is a 80 y.o. female who was previously seen at the [facility] Department of Radiation Oncology for metastatic breast cancer who developed C4-C6 putative metastatic disease.  Treatment included an anterior C5 corpectomy with posterior C2-T2 fusion completed on 3/9/23 followed by SBRT (24 Gy/2fx) to C4-C6 ending on 23.  The patient is currently be managing systemically with Letrozole/ribociclib under the direction of Dr. Kwon.   A restaging PET completed on 10/30/23 showed stable disease.  Today the patient states that he's doing great!  Only complaint is some stiffness in her neck which has remained stable.  Denies chills, fever, fatigue, SOB or chest pain.  Denies headaches, spinal pain, hearing changes or focal sensory/neuro changes.  Does endorses some vision changes related to cataracts.  Denies abdominal pain, nausea, vomiting, diarrhea or constipation.  An MRI of the  cervical completed today shows no evidence of disease.  This was shared with the patient and her daughter.     6/17/24 Interval Visit:   Patient is in clinic today for a routine Radiation Oncology FU visit and review of an MRI of the C-spine.  Today the patient states that she's doing very well!  Continues to c/o some decreased ROM in her neck.  We discussed a referral to PT and the patient declined at this time.  Also endorse some weakness in her upper arms, since surgery, that has remained stable.  She continue to do strengthening exercises on a daily basis.   Denies chills, fever, fatigue, SOB or chest pain.  Denies headaches, spinal pain, hearing changes or focal sensory/neuro changes.  Does endorses some vision changes related to cataracts.  Denies abdominal pain, nausea, vomiting, diarrhea or constipation.  An MRI of the cervical completed today shows stable disease.  This was shared with the patient.     12/3/24 Interval FU visit:   Today the patient is in clinic for a routine Radiation Oncology FU visit and review of an MRI of the C-spine.  Overall, the patient states that she's doing very well.  She continues to have some occasional cervical discomfort with ROM.  She doesn't want PT at this time but will be joining the [facility] to work on strength and stretching.  Denies chills, fever, fatigue, SOB or chest pain.  Denies headaches, spinal pain, hearing changes or focal sensory/neuro changes.  Recently had b/l cataract surgery which has improved her vision a great deal.   Denies abdominal pain, nausea, vomiting, diarrhea or constipation.     An MRI of the C-spine completed today shows Unchanged T1 and T2 hypointense lesion located within the anterior arch of C1 and the left lateral mass of C1. No new suspicious lesions are seen within the visualized osseous structures. Additional stable findings as above.    5/28/25 Interval FU visit:   Today the patient is in clinic for a routine Radiation Oncology FU visit and  review of a MR cervical spine completed today.  Overall the patient continues to do well.  She continues to have some cervical discomfort/stiffness that has been stable.  We discussed a referral to PT and the patient was agreeable.  Denies chills, fever, fatigue, SOB or chest pain.  Denies headaches, point tenderness in the C, T and L spine, hearing changes or focal sensory/neuro changes.  Does report some right hip pain that has been stable for a period of time and is thought to be arthritis. Recently had b/l cataract surgery which has improved her vision a great deal.   Denies abdominal pain, nausea, vomiting, diarrhea or constipation.     Treatment Rendered:   Radiation Treatments       No radiation treatments to show. (Treatments may have been administered in another system.)          Review of Systems:   Review of Systems   Constitutional:  Negative for chills, fatigue and fever.   HENT:   Negative for hearing loss, lump/mass and trouble swallowing.    Eyes:  Negative for eye problems.   Respiratory:  Negative for chest tightness, cough, hemoptysis, shortness of breath and wheezing.    Cardiovascular:  Negative for chest pain and leg swelling.   Gastrointestinal:  Negative for abdominal distention, abdominal pain, blood in stool, constipation, diarrhea, nausea and vomiting.   Genitourinary:  Negative for bladder incontinence, difficulty urinating and hematuria.    Musculoskeletal:  Positive for neck stiffness. Negative for back pain, gait problem and neck pain.   Neurological:  Negative for dizziness, gait problem, headaches, light-headedness, numbness, seizures and speech difficulty.   Hematological:  Negative for adenopathy.   Psychiatric/Behavioral:  Negative for confusion and depression. The patient is not nervous/anxious.      The patient's current pain level was assessed.  They report currently having a pain of 0 out of 10.  They feel their pain is under control without the use of pain  "medications.    Performance Status:   The Karnofsky performance scale today is 80-90%.      OBJECTIVE  Vital Signs:      2/12/2024    12:00 PM 3/11/2024     2:39 PM 6/17/2024    12:10 PM 7/8/2024     8:38 AM 12/3/2024    11:17 AM 12/3/2024    12:55 PM 1/6/2025     3:02 PM   Vitals   Systolic 139 153 113 141  135 142   Diastolic 78 84 71 74  59 56   Heart Rate 62 62 52 58  57 60   Temp 36 °C (96.8 °F) 36.5 °C (97.7 °F) 36 °C (96.8 °F) 36.5 °C (97.7 °F)  35.6 °C (96.1 °F) 36.4 °C (97.5 °F)   Resp 18 16 18 16  18 16   Height 1.499 m (4' 11\")    1.499 m (4' 11\")     Weight (lb) 161.16 164.46 160.27 162.04 160 160 159.39   BMI 32.55 kg/m2 33.22 kg/m2 32.37 kg/m2 32.73 kg/m2 32.32 kg/m2 32.32 kg/m2 32.19 kg/m2   BSA (m2) 1.74 m2 1.76 m2 1.74 m2 1.75 m2 1.74 m2 1.74 m2 1.73 m2   Visit Report  Report  Report   Report      Physical Exam:  Physical Exam  Constitutional:       General: She is not in acute distress.     Appearance: Normal appearance. She is normal weight. She is not ill-appearing, toxic-appearing or diaphoretic.   HENT:      Head: Normocephalic and atraumatic.      Jaw: No trismus.      Nose: Nose normal. No congestion or rhinorrhea.      Mouth/Throat:      Mouth: Mucous membranes are moist.   Eyes:      General: Lids are normal. Gaze aligned appropriately.      Extraocular Movements: Extraocular movements intact.      Pupils: Pupils are equal, round, and reactive to light.   Neck:      Thyroid: No thyroid mass or thyroid tenderness.   Cardiovascular:      Rate and Rhythm: Normal rate and regular rhythm.      Pulses: Normal pulses.      Heart sounds: Normal heart sounds. No murmur heard.  Pulmonary:      Effort: Pulmonary effort is normal. No tachypnea or respiratory distress.      Breath sounds: Normal breath sounds. No wheezing or rhonchi.   Abdominal:      General: Abdomen is flat.      Palpations: Abdomen is soft.   Musculoskeletal:         General: No swelling, tenderness, deformity or signs of injury. " Normal range of motion.      Cervical back: Full passive range of motion without pain, normal range of motion and neck supple. No rigidity or tenderness. No pain with movement. Normal range of motion.      Right lower leg: No edema.      Left lower leg: No edema.   Lymphadenopathy:      Head:      Right side of head: No submental, submandibular, tonsillar, preauricular, posterior auricular or occipital adenopathy.      Left side of head: No submental, submandibular, tonsillar, preauricular, posterior auricular or occipital adenopathy.      Cervical:      Right cervical: No superficial, deep or posterior cervical adenopathy.     Left cervical: No superficial, deep or posterior cervical adenopathy.   Skin:     General: Skin is warm and dry.      Capillary Refill: Capillary refill takes less than 2 seconds.      Coloration: Skin is not jaundiced.      Findings: No erythema, lesion or rash.   Neurological:      General: No focal deficit present.      Mental Status: She is alert and oriented to person, place, and time.      Cranial Nerves: No cranial nerve deficit.      Sensory: No sensory deficit.      Motor: No weakness.      Coordination: Coordination normal.      Gait: Gait normal.   Psychiatric:         Attention and Perception: Attention normal.         Mood and Affect: Mood normal.         Behavior: Behavior normal. Behavior is cooperative.     MR CERVICAL SPINE W AND WO IV CONTRAST; ; 12/3/2024 12:22 pm   IMPRESSION:  Unchanged T1 and T2 hypointense lesion located within the anterior  arch of C1 and the left lateral mass of C1. No new suspicious lesions  are seen within the visualized osseous structures. Additional stable  findings as above.    MR CERVICAL SPINE W AND WO IV CONTRAST; 6/17/2024 11:48 am   IMPRESSION:  Unchanged T1 and T2 hypointense signal on the left at C1. No new  focal lesions.  Postsurgical changes similar to the prior exam    ASSESSMENT:  79 y.o. female who was previously seen at the  [facility] Department of Radiation Oncology for metastatic breast cancer who developed C4-C6 putative metastatic disease.  Treatment included an anterior C5 corpectomy with posterior C2-T2 fusion completed on 3/9/23 followed by SBRT (24 Gy/2fx) to C4-C6 ending on 4/27/23.  The patient is currently being managing systemically with Letrozole/ribociclib under the direction of Dr. Kwon.   The patient continues to do well in follow-up.     PLAN:      --MRI C-Spine in 6 months.   --Referral to PT for shoulder stiffness/discomfort.   --FU in Radiation Oncology in 6 months.      Please reach out with any questions or concerns.     NCCN Guidelines were applicable to guide this patients treatment plan.  Raudel Whyte, BERNARDO-CNP

## 2025-05-28 ENCOUNTER — PHARMACY VISIT (OUTPATIENT)
Dept: PHARMACY | Facility: CLINIC | Age: 80
End: 2025-05-28
Payer: COMMERCIAL

## 2025-05-28 ENCOUNTER — HOSPITAL ENCOUNTER (OUTPATIENT)
Dept: RADIATION ONCOLOGY | Facility: HOSPITAL | Age: 80
Setting detail: RADIATION/ONCOLOGY SERIES
Discharge: HOME | End: 2025-05-28
Payer: MEDICARE

## 2025-05-28 ENCOUNTER — HOSPITAL ENCOUNTER (OUTPATIENT)
Dept: RADIOLOGY | Facility: HOSPITAL | Age: 80
Discharge: HOME | End: 2025-05-28
Payer: MEDICARE

## 2025-05-28 VITALS
BODY MASS INDEX: 31.75 KG/M2 | WEIGHT: 157.2 LBS | HEART RATE: 61 BPM | DIASTOLIC BLOOD PRESSURE: 61 MMHG | SYSTOLIC BLOOD PRESSURE: 146 MMHG | OXYGEN SATURATION: 98 % | RESPIRATION RATE: 18 BRPM | TEMPERATURE: 97.2 F

## 2025-05-28 DIAGNOSIS — C50.919 CARCINOMA OF BREAST METASTATIC TO MULTIPLE SITES, UNSPECIFIED LATERALITY: ICD-10-CM

## 2025-05-28 DIAGNOSIS — C79.51 METASTASIS TO SPINAL COLUMN (MULTI): ICD-10-CM

## 2025-05-28 PROCEDURE — 72156 MRI NECK SPINE W/O & W/DYE: CPT

## 2025-05-28 PROCEDURE — A9575 INJ GADOTERATE MEGLUMI 0.1ML: HCPCS

## 2025-05-28 PROCEDURE — 99214 OFFICE O/P EST MOD 30 MIN: CPT

## 2025-05-28 PROCEDURE — 2550000001 HC RX 255 CONTRASTS

## 2025-05-28 RX ORDER — GADOTERATE MEGLUMINE 376.9 MG/ML
14 INJECTION INTRAVENOUS
Status: COMPLETED | OUTPATIENT
Start: 2025-05-28 | End: 2025-05-28

## 2025-05-28 RX ADMIN — GADOTERATE MEGLUMINE 14 ML: 376.9 INJECTION INTRAVENOUS at 13:14

## 2025-05-28 ASSESSMENT — PATIENT HEALTH QUESTIONNAIRE - PHQ9
2. FEELING DOWN, DEPRESSED OR HOPELESS: NOT AT ALL
1. LITTLE INTEREST OR PLEASURE IN DOING THINGS: NOT AT ALL
SUM OF ALL RESPONSES TO PHQ9 QUESTIONS 1 AND 2: 0

## 2025-05-28 ASSESSMENT — PAIN SCALES - GENERAL: PAINLEVEL_OUTOF10: 0-NO PAIN

## 2025-05-28 ASSESSMENT — ENCOUNTER SYMPTOMS
OCCASIONAL FEELINGS OF UNSTEADINESS: 0
DEPRESSION: 0
LOSS OF SENSATION IN FEET: 0

## 2025-05-29 ENCOUNTER — HOSPITAL ENCOUNTER (OUTPATIENT)
Dept: RADIOLOGY | Facility: HOSPITAL | Age: 80
Discharge: HOME | End: 2025-05-29
Payer: MEDICARE

## 2025-05-29 DIAGNOSIS — C50.919 MALIGNANT NEOPLASM OF BREAST IN FEMALE, ESTROGEN RECEPTOR POSITIVE, UNSPECIFIED LATERALITY, UNSPECIFIED SITE OF BREAST: ICD-10-CM

## 2025-05-29 DIAGNOSIS — Z17.0 MALIGNANT NEOPLASM OF BREAST IN FEMALE, ESTROGEN RECEPTOR POSITIVE, UNSPECIFIED LATERALITY, UNSPECIFIED SITE OF BREAST: ICD-10-CM

## 2025-05-29 PROCEDURE — A9552 F18 FDG: HCPCS | Performed by: STUDENT IN AN ORGANIZED HEALTH CARE EDUCATION/TRAINING PROGRAM

## 2025-05-29 PROCEDURE — 78815 PET IMAGE W/CT SKULL-THIGH: CPT | Mod: PS

## 2025-05-29 PROCEDURE — 3430000001 HC RX 343 DIAGNOSTIC RADIOPHARMACEUTICALS: Performed by: STUDENT IN AN ORGANIZED HEALTH CARE EDUCATION/TRAINING PROGRAM

## 2025-05-29 RX ORDER — FLUDEOXYGLUCOSE F 18 200 MCI/ML
11.5 INJECTION, SOLUTION INTRAVENOUS
Status: COMPLETED | OUTPATIENT
Start: 2025-05-29 | End: 2025-05-29

## 2025-05-29 RX ADMIN — FLUDEOXYGLUCOSE F 18 11.5 MILLICURIE: 200 INJECTION, SOLUTION INTRAVENOUS at 10:28

## 2025-06-09 ENCOUNTER — SPECIALTY PHARMACY (OUTPATIENT)
Dept: HEMATOLOGY/ONCOLOGY | Facility: CLINIC | Age: 80
End: 2025-06-09

## 2025-06-09 ENCOUNTER — OFFICE VISIT (OUTPATIENT)
Dept: HEMATOLOGY/ONCOLOGY | Facility: CLINIC | Age: 80
End: 2025-06-09
Payer: MEDICARE

## 2025-06-09 VITALS
HEART RATE: 58 BPM | WEIGHT: 158.95 LBS | TEMPERATURE: 98.4 F | BODY MASS INDEX: 35.76 KG/M2 | SYSTOLIC BLOOD PRESSURE: 151 MMHG | DIASTOLIC BLOOD PRESSURE: 75 MMHG | OXYGEN SATURATION: 97 % | HEIGHT: 56 IN | RESPIRATION RATE: 16 BRPM

## 2025-06-09 DIAGNOSIS — Z17.0 MALIGNANT NEOPLASM OF BREAST IN FEMALE, ESTROGEN RECEPTOR POSITIVE, UNSPECIFIED LATERALITY, UNSPECIFIED SITE OF BREAST: ICD-10-CM

## 2025-06-09 DIAGNOSIS — C50.919 MALIGNANT NEOPLASM OF BREAST IN FEMALE, ESTROGEN RECEPTOR POSITIVE, UNSPECIFIED LATERALITY, UNSPECIFIED SITE OF BREAST: ICD-10-CM

## 2025-06-09 PROCEDURE — 1159F MED LIST DOCD IN RCRD: CPT | Performed by: STUDENT IN AN ORGANIZED HEALTH CARE EDUCATION/TRAINING PROGRAM

## 2025-06-09 PROCEDURE — 99215 OFFICE O/P EST HI 40 MIN: CPT | Performed by: STUDENT IN AN ORGANIZED HEALTH CARE EDUCATION/TRAINING PROGRAM

## 2025-06-09 PROCEDURE — 1126F AMNT PAIN NOTED NONE PRSNT: CPT | Performed by: STUDENT IN AN ORGANIZED HEALTH CARE EDUCATION/TRAINING PROGRAM

## 2025-06-09 RX ORDER — LETROZOLE 2.5 MG/1
2.5 TABLET, FILM COATED ORAL DAILY
Qty: 90 TABLET | Refills: 4 | Status: SHIPPED | OUTPATIENT
Start: 2025-06-09

## 2025-06-09 RX ORDER — LETROZOLE 2.5 MG/1
2.5 TABLET, FILM COATED ORAL DAILY
Qty: 90 TABLET | Refills: 4 | Status: SHIPPED | OUTPATIENT
Start: 2025-06-09 | End: 2025-06-09

## 2025-06-09 ASSESSMENT — PAIN SCALES - GENERAL: PAINLEVEL_OUTOF10: 0-NO PAIN

## 2025-06-09 NOTE — PROGRESS NOTES
Breast Medical Oncology Clinic  Location: [place]    Visit Type: Follow-up Visit    ONC History:     Mammogram 8/2022 No mammographic evidence of malignancy. Continued screening with annual  mammograms is recommended.   Patient lives in [place] and was in her usual state of health until she noticed BL shoulder pain in December.   She saw her PCP who obtained plain film imaging which reflexed to MRI. C spine MRI showed C5 vertebral destruction as well as additional C  spine lesions.    3/2/2023 MRI C spine: 1. Pathologic fracture at C5 with near complete vertebral body height loss and posterior cortex bowing resulting in severe canal stenosis at C4-5 and C5-6. Abnormal enhancing tissue fills the bilateral neural foramen at C4-5 and  C5-6 and causes severe bilateral foraminal stenosis, likely neoplastic. Findings are most consistent with multifocal osseous metastatic disease involving at least C1, C4, C5, and C6. Multilevel degenerative changes with additional moderate canal stenosis  at C6-7.   3/3/2023 CT C spine: Near complete destruction of the C5 vertebral body with abnormal epidural tissue resulting in the known severe canal stenosis and neural foraminal stenosis as seen on prior MRI. Destructive lesions involving C1 and C4 through C6 consistent  with a neoplastic process. Findings are similar to yesterday's MRI allowing for differences in technique.   3/3/2023 CT C/A/P: An enlarged bilobed right axillary lymph node with a fatty hilum superiorly measures approximately 1.8 x 1.5 x 3.1 cm (AP x TRV x CC). This appears to have mild surrounding inflammatory changes. No left axillary lymphadenopathy is identified.  No mediastinal or hilar lymphadenopathy.   3/4/2023 Bone scan Diffuse mild abnormal uptake at the level C5 involving the remainder of the vertebral body and the surrounding soft tissues, this could be infectious or neoplastic.   3/5/2023 MRI T spine: Inferior T7 and superior T9 lesions. The T7  lesion is likely Schmorl's node formation, but the T9 lesion has an appearance at least suggestive of marrow replacement, and metastatic disease is in the differential diagnosis   3/5/2023 She was transferred to [facility] care.   3/6/2023: R axillary LN bx: Metastatic breast cancer, ER positive >95%, OH positive 1-5%, HER2 negative (1+)   3/9/2023: C5 corpectomy, strut graft, C2-T2 fusion, C4-6 decompression on 3/10 as well as R axillary gabbi biopsy with pathology returning as ISABEL+ adenocarcinoma likely breast in origin.    3/14/2023: Started on letrozole   4/11/23: Started kisqali   4/25-4/26: SBRT to C4-6 metastatic disease      Subjective: Interval History    History of Present Illness    Patient presents today for follow-up visit.     Since she was last seen she has undergone EGD- showed mild esophagitis.     Otherwise, doing well.     She reports a persistent sensation of pressure from the rods neck. She has scheduled an appointment with the physical therapy department at [facility] on the upcoming Thursday. She recently saw radiation oncology who recommended physical therapy and medical massage to alleviate the stiffness in her shoulder blade muscles, a condition she has been dealing with for some time. Despite attempts to relax her deltoids through arm lifting exercises, she has not observed any improvement.    She is currently on a regimen of letrozole and Kisqali, which she tolerates well.     She has observed changes in her skin pigmentation, with freckles transitioning to brown patches, and perceives her complexion as paler than usual. Additionally, she notes that hair growth on the top of her head has ceased, while it continues on the sides and back.    In good spirits. No other concerns at this time.       GYN History/Pertinent Family history:    Family History: Father with prostate cancer and mets to brain, sister with breast cancer dx at 55.     Social History: Lives alone in [place],  "remote 12 pack year smoking hx. Rare EtOH. Niece Cindy Chowdhury is NOK/HCPOA.     Social History  Iva Colby \"Deepthi\"  reports that she has never smoked. She has never used smokeless tobacco.  She  reports that she does not currently use alcohol.  She  reports no history of drug use.    ROS:     Review of Systems   All other systems reviewed and are negative.       Physical Examination:    /75   Pulse 58   Temp 36.9 °C (98.4 °F) (Temporal)   Resp 16   Ht (S) 1.435 m (4' 8.5\")   Wt 72.1 kg (158 lb 15.2 oz)   SpO2 97%   BMI 35.01 kg/m²     Physical Exam  Vitals and nursing note reviewed.   Constitutional:       General: She is not in acute distress.     Appearance: Normal appearance. She is not toxic-appearing.   HENT:      Head: Normocephalic and atraumatic.   Eyes:      Conjunctiva/sclera: Conjunctivae normal.   Cardiovascular:      Rate and Rhythm: Normal rate.   Pulmonary:      Effort: Pulmonary effort is normal. No respiratory distress.   Musculoskeletal:      Cervical back: Normal range of motion.   Skin:     General: Skin is warm and dry.   Neurological:      Mental Status: She is alert.   Psychiatric:         Mood and Affect: Mood normal.         Breast Examination:    Deferred    ECOG Performance Status:     [] 0 Fully active, able to carry on all pre-disease performance without restriction  [x] 1 Restricted in physically strenuous activity but ambulatory and able to carry out work of a light or sedentary nature, e.g., light house work, office work  [] 2 Ambulatory and capable of all selfcare but unable to carry out any work activities; up and about more than 50% of waking hours  [] 3 Capable of only limited selfcare; confined to bed or chair more than 50% of waking hours  [] 4 Completely disabled; cannot carry on any selfcare; totally confined to bed or chair  [] 5 Dead     Results:    Labs:  10/11/24:    WBC 2.5 , ANC 1.5  Hgb 11.6  Plt 211  CEA 1.6  [id number] 40.8      Imaging:  Reviewed " 12/19/2024 PET    Pathology:  No new pertinent results since last visit    Assessment:     [place] metastatic breast cancer with bone only involvement, initial presentation 3/2023 with cervical spine fracture; ER/IL positive, HER2 negative (1+ on LN,  0 on bone bx). S/p C5 corpectomy, strut graft, C2-T2 fusion, C4-6 decompression on 3/10/23 with C4-6 SBRT in April 2023. On letrozole and  kisqali.     Iva is a very pleasant post-menopausal woman with newly diagnosed breast cancer with history of hypothyroidism. Tolerating treatment exceedingly well. Scans continue to show stable disease.     Plan:    Surgical Plan: Not indicated  Additional biopsy: Not indicated  Radiation Plan: C4-6 SBRT in April 2023.  Additional staging scans/DEXA/echo: Staging scans reviewed  Additional Path info (i.e Ki67, PDL1): MSI high not detected. CCND1 amplification, FGFR1  amplification, TP53 p.R282G ([id number] c.[id number]>G). Negative for BRAF V600E, PIK3CA and ESR1.   Gene assays:  Not indicated     Systemic treatment plan: Letrozole and ribociclib                   Intent: Palliative, discussed in detail with patient. She has forgotten several times during our prior encounters. We discussed extensively that stage IV disease and metastatic disease are interchangeable terms.                 Clinical trial: N/A                Endocrine therapy: Letrozole                HER2 treatment: Not indicated                Targeted agents: Ribociclib as above                Chemotherapy: Not indicated                BMA: We previously discussed the role of Denosumab (allergy to fosamax) in light of osseous disease to minimize the risk and delay skeletal related events. Patient previously declined. Given JENNIFER on recent PET we will defer this for now.      Access: Not indicated  Supportive meds: Not indicated this visit  Genetic testing: Declined, Will discuss again at a future visit  Fertility preservation: Not indicated  Other active  problems/orders:      Limited ROM of neck: S/p C5 corpectomy, strut graft, C2-T2 fusion, C4-6 decompression on 3/10/23 with C4-6 SBRT. She follows with neurosurgery and radiation oncology with MRI of cervical spine    Focal FDG uptake in the distal esophagus, favored to represent esophagitis: Recent EGD showed this to be mild esophagitis. Resolved on repeat PET scan.        Surveillance plan: Pet in 5-6 months per patient request to push back imaging; every 3 month labs and TM given stable findings.      Follow-up: 12/2025 for scan review     Patient expressed understanding of the plan outlined above. She had ample time to ask questions. She understands she can contact us should she have additional questions or issues arise in the interim.

## 2025-06-09 NOTE — PROGRESS NOTES
FUV completed. PET/CT scan ordered prior to next FUV in 6 months with Dr Kwon. Pt to obtain labs every 3 months. POC reviewed with pt. who verbalizes understanding per teach back method. Pt agreeable to call our office for any questions, issues, or concerns.  Pt to scheduling prior to discharge from office.

## 2025-06-17 ENCOUNTER — SPECIALTY PHARMACY (OUTPATIENT)
Dept: PHARMACY | Facility: CLINIC | Age: 80
End: 2025-06-17

## 2025-06-20 ENCOUNTER — SPECIALTY PHARMACY (OUTPATIENT)
Dept: PHARMACY | Facility: CLINIC | Age: 80
End: 2025-06-20

## 2025-06-20 PROCEDURE — RXMED WILLOW AMBULATORY MEDICATION CHARGE

## 2025-06-21 ENCOUNTER — PHARMACY VISIT (OUTPATIENT)
Dept: PHARMACY | Facility: CLINIC | Age: 80
End: 2025-06-21
Payer: COMMERCIAL

## 2025-06-24 ENCOUNTER — SPECIALTY PHARMACY (OUTPATIENT)
Dept: PHARMACY | Facility: CLINIC | Age: 80
End: 2025-06-24

## 2025-07-16 ENCOUNTER — SPECIALTY PHARMACY (OUTPATIENT)
Dept: PHARMACY | Facility: CLINIC | Age: 80
End: 2025-07-16

## 2025-07-16 PROCEDURE — RXMED WILLOW AMBULATORY MEDICATION CHARGE

## 2025-07-22 ENCOUNTER — PHARMACY VISIT (OUTPATIENT)
Dept: PHARMACY | Facility: CLINIC | Age: 80
End: 2025-07-22
Payer: COMMERCIAL

## 2025-08-04 ENCOUNTER — ONCOLOGY MEDICATION OUTREACH (OUTPATIENT)
Dept: HEMATOLOGY/ONCOLOGY | Facility: CLINIC | Age: 80
End: 2025-08-04
Payer: MEDICARE

## 2025-08-13 DIAGNOSIS — Z17.0 MALIGNANT NEOPLASM OF BREAST IN FEMALE, ESTROGEN RECEPTOR POSITIVE, UNSPECIFIED LATERALITY, UNSPECIFIED SITE OF BREAST: ICD-10-CM

## 2025-08-13 DIAGNOSIS — C50.919 MALIGNANT NEOPLASM OF BREAST IN FEMALE, ESTROGEN RECEPTOR POSITIVE, UNSPECIFIED LATERALITY, UNSPECIFIED SITE OF BREAST: ICD-10-CM

## 2025-08-18 ENCOUNTER — SPECIALTY PHARMACY (OUTPATIENT)
Dept: PHARMACY | Facility: CLINIC | Age: 80
End: 2025-08-18

## 2025-08-18 PROCEDURE — RXMED WILLOW AMBULATORY MEDICATION CHARGE

## 2025-08-19 ENCOUNTER — PHARMACY VISIT (OUTPATIENT)
Dept: PHARMACY | Facility: CLINIC | Age: 80
End: 2025-08-19
Payer: COMMERCIAL